# Patient Record
Sex: FEMALE | Race: WHITE | NOT HISPANIC OR LATINO | Employment: FULL TIME | URBAN - METROPOLITAN AREA
[De-identification: names, ages, dates, MRNs, and addresses within clinical notes are randomized per-mention and may not be internally consistent; named-entity substitution may affect disease eponyms.]

---

## 2023-02-23 PROBLEM — J01.90 ACUTE NON-RECURRENT SINUSITIS: Status: ACTIVE | Noted: 2023-02-23

## 2023-02-23 PROBLEM — F52.6 SEXUAL PAIN DISORDER: Status: ACTIVE | Noted: 2023-02-23

## 2023-02-23 PROBLEM — J02.9 ACUTE PHARYNGITIS: Status: ACTIVE | Noted: 2023-02-23

## 2023-02-23 PROBLEM — G43.711 CHRONIC MIGRAINE WITHOUT AURA, WITH INTRACTABLE MIGRAINE, SO STATED, WITH STATUS MIGRAINOSUS: Status: ACTIVE | Noted: 2023-02-23

## 2023-02-23 PROBLEM — G43.109 MIGRAINE WITH VISUAL AURA: Status: ACTIVE | Noted: 2023-02-23

## 2023-02-23 PROBLEM — G44.40 MEDICATION OVERUSE HEADACHE: Status: ACTIVE | Noted: 2023-02-23

## 2023-02-23 PROBLEM — J18.1 PNEUMONIA, LOBAR (CMS-HCC): Status: ACTIVE | Noted: 2023-02-23

## 2023-02-23 PROBLEM — F41.9 ANXIETY SYNDROME: Status: ACTIVE | Noted: 2023-02-23

## 2023-02-23 PROBLEM — N95.2 ATROPHIC VAGINITIS: Status: ACTIVE | Noted: 2023-02-23

## 2023-02-23 PROBLEM — K21.9 GERD (GASTROESOPHAGEAL REFLUX DISEASE): Status: ACTIVE | Noted: 2023-02-23

## 2023-02-23 PROBLEM — I34.1 MITRAL VALVE PROLAPSE, NONRHEUMATIC: Status: ACTIVE | Noted: 2023-02-23

## 2023-02-23 PROBLEM — T88.59XA ANESTHESIA COMPLICATION: Status: ACTIVE | Noted: 2023-02-23

## 2023-02-23 PROBLEM — E55.9 VITAMIN D DEFICIENCY: Status: ACTIVE | Noted: 2023-02-23

## 2023-02-23 PROBLEM — R05.2 SUBACUTE COUGH: Status: ACTIVE | Noted: 2023-02-23

## 2023-02-23 PROBLEM — R93.5 ABNORMAL ULTRASOUND OF ENDOMETRIUM: Status: ACTIVE | Noted: 2023-02-23

## 2023-02-23 PROBLEM — R10.2 FEMALE PELVIC PAIN: Status: ACTIVE | Noted: 2023-02-23

## 2023-02-23 PROBLEM — N80.03 ADENOMYOSIS: Status: ACTIVE | Noted: 2023-02-23

## 2023-02-23 PROBLEM — N93.9 ABNORMAL UTERINE BLEEDING (AUB): Status: ACTIVE | Noted: 2023-02-23

## 2023-02-23 PROBLEM — H66.91 RIGHT OTITIS MEDIA: Status: ACTIVE | Noted: 2023-02-23

## 2023-02-23 PROBLEM — D49.2 ABNORMAL SKIN GROWTH: Status: ACTIVE | Noted: 2023-02-23

## 2023-02-23 PROBLEM — R92.30 DENSE BREAST TISSUE ON MAMMOGRAM: Status: ACTIVE | Noted: 2023-02-23

## 2023-02-23 PROBLEM — R35.0 URINARY FREQUENCY: Status: ACTIVE | Noted: 2023-02-23

## 2023-02-23 RX ORDER — EPINEPHRINE 0.22MG
1 AEROSOL WITH ADAPTER (ML) INHALATION DAILY
COMMUNITY

## 2023-02-23 RX ORDER — MEDROXYPROGESTERONE ACETATE 10 MG/1
10 TABLET ORAL
COMMUNITY
Start: 2022-07-26 | End: 2023-10-25 | Stop reason: ALTCHOICE

## 2023-02-23 RX ORDER — DEXTROMETHORPHAN POLISTIREX 30 MG/5ML
5 SUSPENSION ORAL 2 TIMES DAILY
COMMUNITY
End: 2023-10-25 | Stop reason: ALTCHOICE

## 2023-02-23 RX ORDER — ACETAMINOPHEN 500 MG
1 TABLET ORAL DAILY
COMMUNITY

## 2023-02-23 RX ORDER — ALBUTEROL SULFATE 0.83 MG/ML
2.5 SOLUTION RESPIRATORY (INHALATION)
COMMUNITY
End: 2023-10-25 | Stop reason: ALTCHOICE

## 2023-02-23 RX ORDER — FREMANEZUMAB-VFRM 225 MG/1.5ML
3 INJECTION SUBCUTANEOUS
COMMUNITY
Start: 2021-12-07 | End: 2024-05-07 | Stop reason: SDUPTHER

## 2023-02-23 RX ORDER — GARLIC 1000 MG
1 CAPSULE ORAL DAILY
COMMUNITY
End: 2023-10-25 | Stop reason: SDUPTHER

## 2023-02-23 RX ORDER — SUMATRIPTAN SUCCINATE 100 MG/1
100 TABLET ORAL
COMMUNITY
Start: 2021-06-29 | End: 2023-04-17

## 2023-02-23 RX ORDER — KETOROLAC TROMETHAMINE 10 MG/1
10 TABLET, FILM COATED ORAL EVERY 6 HOURS
COMMUNITY
Start: 2022-09-29 | End: 2023-10-25 | Stop reason: ALTCHOICE

## 2023-02-23 RX ORDER — UBIDECARENONE 30 MG
1 CAPSULE ORAL DAILY
COMMUNITY

## 2023-02-23 RX ORDER — PANTOPRAZOLE SODIUM 40 MG/1
40 TABLET, DELAYED RELEASE ORAL DAILY
COMMUNITY
End: 2023-10-25 | Stop reason: ALTCHOICE

## 2023-02-23 RX ORDER — ORPHENADRINE CITRATE 100 MG/1
100 TABLET, EXTENDED RELEASE ORAL NIGHTLY PRN
COMMUNITY
End: 2023-10-25 | Stop reason: ALTCHOICE

## 2023-03-06 ENCOUNTER — APPOINTMENT (OUTPATIENT)
Dept: PRIMARY CARE | Facility: CLINIC | Age: 53
End: 2023-03-06

## 2023-03-16 ENCOUNTER — TELEPHONE (OUTPATIENT)
Dept: PRIMARY CARE | Facility: CLINIC | Age: 53
End: 2023-03-16

## 2023-03-16 NOTE — TELEPHONE ENCOUNTER
Patient called stating that she is feeling sick again. It started a week ago now. She's feeling achy and has been cough. She said it feels like her pneumonia might be creeping back in. She was wondering what she should do about this.

## 2023-03-28 ENCOUNTER — OFFICE VISIT (OUTPATIENT)
Dept: PRIMARY CARE | Facility: CLINIC | Age: 53
End: 2023-03-28
Payer: COMMERCIAL

## 2023-03-28 VITALS
BODY MASS INDEX: 20.03 KG/M2 | RESPIRATION RATE: 16 BRPM | SYSTOLIC BLOOD PRESSURE: 118 MMHG | TEMPERATURE: 97.6 F | HEIGHT: 67 IN | DIASTOLIC BLOOD PRESSURE: 70 MMHG | HEART RATE: 77 BPM | WEIGHT: 127.6 LBS | OXYGEN SATURATION: 98 %

## 2023-03-28 DIAGNOSIS — J40 BRONCHITIS: ICD-10-CM

## 2023-03-28 DIAGNOSIS — Z00.00 WELL ADULT EXAM: Primary | ICD-10-CM

## 2023-03-28 DIAGNOSIS — Z00.00 HEALTH CARE MAINTENANCE: ICD-10-CM

## 2023-03-28 DIAGNOSIS — L65.9 HAIR LOSS: ICD-10-CM

## 2023-03-28 DIAGNOSIS — E55.9 VITAMIN D DEFICIENCY: ICD-10-CM

## 2023-03-28 DIAGNOSIS — Z12.31 BREAST CANCER SCREENING BY MAMMOGRAM: ICD-10-CM

## 2023-03-28 DIAGNOSIS — R92.30 DENSE BREAST TISSUE ON MAMMOGRAM: ICD-10-CM

## 2023-03-28 PROCEDURE — 99396 PREV VISIT EST AGE 40-64: CPT | Performed by: INTERNAL MEDICINE

## 2023-03-28 PROCEDURE — 1036F TOBACCO NON-USER: CPT | Performed by: INTERNAL MEDICINE

## 2023-03-28 RX ORDER — BENZONATATE 100 MG/1
100 CAPSULE ORAL 3 TIMES DAILY PRN
Qty: 42 CAPSULE | Refills: 0 | Status: SHIPPED | OUTPATIENT
Start: 2023-03-28 | End: 2023-03-28 | Stop reason: SDUPTHER

## 2023-03-28 RX ORDER — BENZONATATE 100 MG/1
100 CAPSULE ORAL 3 TIMES DAILY PRN
Qty: 42 CAPSULE | Refills: 0 | Status: SHIPPED | OUTPATIENT
Start: 2023-03-28 | End: 2023-04-27

## 2023-03-28 ASSESSMENT — ENCOUNTER SYMPTOMS
CHEST TIGHTNESS: 0
CHILLS: 0
SHORTNESS OF BREATH: 0
SORE THROAT: 0
NAUSEA: 0
ABDOMINAL PAIN: 0
VOMITING: 0
ROS SKIN COMMENTS: HAIR LOSS.
FEVER: 0
CONSTIPATION: 0
FATIGUE: 1
DIFFICULTY URINATING: 0
SINUS PAIN: 0
COUGH: 0
WHEEZING: 0
COUGH: 1
WEAKNESS: 0
OCCASIONAL FEELINGS OF UNSTEADINESS: 0
DIZZINESS: 0
EYE DISCHARGE: 0
FATIGUE: 0
JOINT SWELLING: 0
HEADACHES: 0
LOSS OF SENSATION IN FEET: 0
APPETITE CHANGE: 0
DIARRHEA: 0
PALPITATIONS: 0
FREQUENCY: 0
SPEECH DIFFICULTY: 0
ARTHRALGIAS: 0
DIAPHORESIS: 0

## 2023-03-28 ASSESSMENT — PATIENT HEALTH QUESTIONNAIRE - PHQ9
1. LITTLE INTEREST OR PLEASURE IN DOING THINGS: NOT AT ALL
2. FEELING DOWN, DEPRESSED OR HOPELESS: NOT AT ALL
SUM OF ALL RESPONSES TO PHQ9 QUESTIONS 1 AND 2: 0

## 2023-03-28 ASSESSMENT — PAIN SCALES - GENERAL: PAINLEVEL: 0-NO PAIN

## 2023-03-28 NOTE — PROGRESS NOTES
Subjective   Patient ID: Jeana Graham is a 52 y.o. female who presents for Annual Exam (CPE).    Well Adult Physical   Patient here for a comprehensive physical exam.The patient reports  problems- major hair loss x2 weeks     Do you take any herbs or supplements that were not prescribed by a doctor? yes   Are you taking calcium supplements? yes   Are you taking aspirin daily? no     History:  LMP: No LMP recorded.  Menopause at none  years  Last pap date:   Abnormal pap? yes  : 3           Patient thinks her pneumonia is coming back last 2 weeks- has a cough, fatigue        Review of Systems   Constitutional:  Positive for fatigue.   Respiratory:  Positive for cough.    All other systems reviewed and are negative.      Objective   There were no vitals taken for this visit.    Physical Exam    Assessment/Plan

## 2023-03-28 NOTE — PROGRESS NOTES
"Subjective   Jeana Graham is a 52 y.o. female who presents for Annual Exam (CPE).      Thought her pneumonia was coming back but then saw that there was an antibiotic at home and took it but doesn't know what it was.    No history of asthma.  Losing hair and thinks it may be hormonal.  No bald spots per her .    Has had irregular menses since COVID.         Review of Systems   Constitutional:  Negative for appetite change, chills, diaphoresis, fatigue and fever.   HENT:  Negative for congestion, ear discharge, ear pain, sinus pain and sore throat.    Eyes:  Negative for discharge.   Respiratory:  Negative for cough, chest tightness, shortness of breath and wheezing.    Cardiovascular:  Negative for chest pain, palpitations and leg swelling.   Gastrointestinal:  Negative for abdominal pain, constipation, diarrhea, nausea and vomiting.   Endocrine: Negative for cold intolerance, heat intolerance and polyuria.   Genitourinary:  Negative for difficulty urinating and frequency.   Musculoskeletal:  Negative for arthralgias and joint swelling.   Skin:  Negative for rash.        Hair loss.     Neurological:  Negative for dizziness, speech difficulty, weakness and headaches.       Objective   /70 (BP Location: Left arm, Patient Position: Sitting)   Pulse 77   Temp 36.4 °C (97.6 °F)   Resp 16   Ht 1.708 m (5' 7.25\")   Wt 57.9 kg (127 lb 9.6 oz)   LMP 01/04/2023 (Exact Date) Comment: had spotting last week  SpO2 98%   BMI 19.84 kg/m²       Physical Exam  Constitutional:       Appearance: Normal appearance.   HENT:      Head: Normocephalic.      Right Ear: Tympanic membrane, ear canal and external ear normal.      Left Ear: Tympanic membrane, ear canal and external ear normal.      Nose: Nose normal.      Mouth/Throat:      Mouth: Mucous membranes are moist.      Pharynx: Oropharynx is clear.   Eyes:      Conjunctiva/sclera: Conjunctivae normal.   Cardiovascular:      Rate and Rhythm: Normal rate and " regular rhythm.   Pulmonary:      Effort: Pulmonary effort is normal.      Breath sounds: Normal breath sounds.   Abdominal:      General: Abdomen is flat. Bowel sounds are normal.      Palpations: Abdomen is soft.   Musculoskeletal:         General: Normal range of motion.      Cervical back: Neck supple.   Skin:     General: Skin is warm and dry.   Neurological:      General: No focal deficit present.      Mental Status: She is alert and oriented to person, place, and time.   Psychiatric:         Mood and Affect: Mood normal.         Assessment/Plan   Problem List Items Addressed This Visit          Respiratory    Bronchitis     Jeana presents here today for her annual wellness visit but is also concerned about her ongoing cough.  She had a COVID infection and subsequent lobar pneumonia in the last 3 months, and he is concerned that this is returned.  Her exam today is unremarkable and her lungs are completely clear.  There are no wheezes rales or rhonchi throughout.  Her pulse ox is 98% and she is afebrile.  We will continue to treat with as needed albuterol clear liquids and add Tessalon Perles for cough.            Endocrine/Metabolic    Vitamin D deficiency       Other    Dense breast tissue on mammogram    Relevant Orders    BI mammo bilateral screening tomosynthesis    Well adult exam - Primary    Relevant Orders    Comprehensive Metabolic Panel    Lipid Panel    TSH with reflex to Free T4 if abnormal    Hair loss     Jeana is also concerned about some hair loss noted that since her COVID-19 infection she is unable to run her fingers through her hair and drink here out.  Her hairdresser does not notice any clumps or loss of hair that is obvious.  Today's exam does not demonstrate any areas of alopecia and no hair follicles appear intact.  I did suggest that this could be a natural hair loss due to the stress of her recent COVID-19 infection as well as her left lower lobe pneumonia.  We will monitor this  carefully.  She is also due to see her OB/GYN GYN in the coming months and will be evaluated at that point for postmenopausal symptoms.         Relevant Orders    Comprehensive Metabolic Panel    TSH with reflex to Free T4 if abnormal     Other Visit Diagnoses       Breast cancer screening by mammogram        Relevant Orders    BI mammo bilateral screening tomosynthesis    CBC    TSH with reflex to Free T4 if abnormal          Encounter Diagnoses   Name Primary?    Well adult exam Yes    Breast cancer screening by mammogram     Vitamin D deficiency     Dense breast tissue on mammogram     Hair loss     Bronchitis      Neri Chatman, DO

## 2023-03-30 PROBLEM — J40 BRONCHITIS: Status: ACTIVE | Noted: 2023-03-30

## 2023-03-30 PROBLEM — J18.1 PNEUMONIA, LOBAR (CMS-HCC): Status: RESOLVED | Noted: 2023-02-23 | Resolved: 2023-03-30

## 2023-03-30 PROBLEM — J02.9 ACUTE PHARYNGITIS: Status: RESOLVED | Noted: 2023-02-23 | Resolved: 2023-03-30

## 2023-03-30 PROBLEM — R05.2 SUBACUTE COUGH: Status: RESOLVED | Noted: 2023-02-23 | Resolved: 2023-03-30

## 2023-03-30 PROBLEM — L65.9 HAIR LOSS: Status: ACTIVE | Noted: 2023-03-30

## 2023-03-30 PROBLEM — H66.91 RIGHT OTITIS MEDIA: Status: RESOLVED | Noted: 2023-02-23 | Resolved: 2023-03-30

## 2023-03-30 NOTE — ASSESSMENT & PLAN NOTE
Jeana is also concerned about some hair loss noted that since her COVID-19 infection she is unable to run her fingers through her hair and drink here out.  Her hairdresser does not notice any clumps or loss of hair that is obvious.  Today's exam does not demonstrate any areas of alopecia and no hair follicles appear intact.  I did suggest that this could be a natural hair loss due to the stress of her recent COVID-19 infection as well as her left lower lobe pneumonia.  We will monitor this carefully.  She is also due to see her OB/GYN GYN in the coming months and will be evaluated at that point for postmenopausal symptoms.

## 2023-03-30 NOTE — ASSESSMENT & PLAN NOTE
Jeana presents here today for her annual wellness visit but is also concerned about her ongoing cough.  She had a COVID infection and subsequent lobar pneumonia in the last 3 months, and he is concerned that this is returned.  Her exam today is unremarkable and her lungs are completely clear.  There are no wheezes rales or rhonchi throughout.  Her pulse ox is 98% and she is afebrile.  We will continue to treat with as needed albuterol clear liquids and add Tessalon Perles for cough.

## 2023-04-06 ENCOUNTER — OFFICE VISIT (OUTPATIENT)
Dept: PRIMARY CARE | Facility: CLINIC | Age: 53
End: 2023-04-06
Payer: COMMERCIAL

## 2023-04-06 VITALS
BODY MASS INDEX: 20.07 KG/M2 | HEART RATE: 97 BPM | RESPIRATION RATE: 16 BRPM | TEMPERATURE: 98 F | DIASTOLIC BLOOD PRESSURE: 76 MMHG | WEIGHT: 127.9 LBS | SYSTOLIC BLOOD PRESSURE: 134 MMHG | OXYGEN SATURATION: 99 % | HEIGHT: 67 IN

## 2023-04-06 DIAGNOSIS — R53.83 OTHER FATIGUE: ICD-10-CM

## 2023-04-06 DIAGNOSIS — R05.3 CHRONIC COUGH: ICD-10-CM

## 2023-04-06 DIAGNOSIS — J18.9 PNEUMONIA OF RIGHT UPPER LOBE DUE TO INFECTIOUS ORGANISM: Primary | ICD-10-CM

## 2023-04-06 PROCEDURE — 1036F TOBACCO NON-USER: CPT | Performed by: INTERNAL MEDICINE

## 2023-04-06 PROCEDURE — 93000 ELECTROCARDIOGRAM COMPLETE: CPT | Performed by: INTERNAL MEDICINE

## 2023-04-06 PROCEDURE — 99214 OFFICE O/P EST MOD 30 MIN: CPT | Performed by: INTERNAL MEDICINE

## 2023-04-06 ASSESSMENT — ENCOUNTER SYMPTOMS: COUGH: 1

## 2023-04-06 NOTE — PROGRESS NOTES
"Subjective   Jeana Graham is a 52 y.o. female who presents for Follow-up (Chest x-ray) and Cough.      Cough  This is a recurrent problem. The current episode started more than 1 month ago. The problem has been gradually improving. The problem occurs every few minutes. The cough is Non-productive.       Review of Systems   Respiratory:  Positive for cough.        Objective   /76   Pulse 97   Temp 36.7 °C (98 °F)   Resp 16   Ht 1.708 m (5' 7.25\")   Wt 58 kg (127 lb 14.4 oz)   LMP 01/04/2023 (Exact Date) Comment: had spotting last week  SpO2 99%   BMI 19.88 kg/m²       Physical Exam  Constitutional:       General: She is not in acute distress.     Appearance: She is ill-appearing. She is not toxic-appearing or diaphoretic.   HENT:      Right Ear: Tympanic membrane normal.      Left Ear: Tympanic membrane normal.      Nose: Nose normal.      Mouth/Throat:      Mouth: Mucous membranes are moist.   Eyes:      Extraocular Movements: Extraocular movements intact.      Conjunctiva/sclera: Conjunctivae normal.      Pupils: Pupils are equal, round, and reactive to light.   Cardiovascular:      Rate and Rhythm: Normal rate and regular rhythm.      Heart sounds: No murmur heard.     No friction rub.   Pulmonary:      Effort: Pulmonary effort is normal. No accessory muscle usage or respiratory distress.      Breath sounds: Normal breath sounds. No stridor. No wheezing, rhonchi or rales.   Chest:      Chest wall: No tenderness.   Musculoskeletal:      Cervical back: Normal range of motion and neck supple. No rigidity or tenderness.   Skin:     General: Skin is warm and dry.      Coloration: Skin is pale.      Findings: No erythema or rash.   Neurological:      General: No focal deficit present.      Mental Status: She is alert and oriented to person, place, and time.         Assessment/Plan   Problem List Items Addressed This Visit          Respiratory    Pneumonia of right upper lobe due to infectious organism - " Primary       Other    Other fatigue    Relevant Orders    ECG 12 lead (Clinic Performed)    XR chest 2 views (Completed)    Comprehensive Metabolic Panel (Completed)    CBC (Completed)    Sedimentation Rate (Completed)    C-reactive protein (Completed)     Other Visit Diagnoses       Chronic cough        Relevant Orders    ECG 12 lead (Clinic Performed)    XR chest 2 views (Completed)    Comprehensive Metabolic Panel (Completed)    CBC (Completed)    Sedimentation Rate (Completed)    C-reactive protein (Completed)          Encounter Diagnoses   Name Primary?    Pneumonia of right upper lobe due to infectious organism Yes    Chronic cough     Other fatigue      Neri Chatman, DO

## 2023-04-07 ENCOUNTER — LAB (OUTPATIENT)
Dept: LAB | Facility: LAB | Age: 53
End: 2023-04-07
Payer: COMMERCIAL

## 2023-04-07 DIAGNOSIS — Z00.00 WELL ADULT EXAM: ICD-10-CM

## 2023-04-07 DIAGNOSIS — J18.9 PNEUMONIA OF RIGHT UPPER LOBE DUE TO INFECTIOUS ORGANISM: Primary | ICD-10-CM

## 2023-04-07 DIAGNOSIS — R05.3 CHRONIC COUGH: ICD-10-CM

## 2023-04-07 DIAGNOSIS — L65.9 HAIR LOSS: ICD-10-CM

## 2023-04-07 DIAGNOSIS — Z12.31 BREAST CANCER SCREENING BY MAMMOGRAM: ICD-10-CM

## 2023-04-07 DIAGNOSIS — R53.83 OTHER FATIGUE: ICD-10-CM

## 2023-04-07 LAB
ALANINE AMINOTRANSFERASE (SGPT) (U/L) IN SER/PLAS: 15 U/L (ref 7–45)
ALBUMIN (G/DL) IN SER/PLAS: 3.6 G/DL (ref 3.4–5)
ALKALINE PHOSPHATASE (U/L) IN SER/PLAS: 96 U/L (ref 33–110)
ANION GAP IN SER/PLAS: 14 MMOL/L (ref 10–20)
ASPARTATE AMINOTRANSFERASE (SGOT) (U/L) IN SER/PLAS: 13 U/L (ref 9–39)
BILIRUBIN TOTAL (MG/DL) IN SER/PLAS: 0.5 MG/DL (ref 0–1.2)
C REACTIVE PROTEIN (MG/L) IN SER/PLAS: 10.79 MG/DL
CALCIUM (MG/DL) IN SER/PLAS: 9.5 MG/DL (ref 8.6–10.3)
CARBON DIOXIDE, TOTAL (MMOL/L) IN SER/PLAS: 28 MMOL/L (ref 21–32)
CHLORIDE (MMOL/L) IN SER/PLAS: 102 MMOL/L (ref 98–107)
CHOLESTEROL (MG/DL) IN SER/PLAS: 143 MG/DL (ref 0–199)
CHOLESTEROL IN HDL (MG/DL) IN SER/PLAS: 42.3 MG/DL
CHOLESTEROL/HDL RATIO: 3.4
CREATININE (MG/DL) IN SER/PLAS: 0.82 MG/DL (ref 0.5–1.05)
ERYTHROCYTE DISTRIBUTION WIDTH (RATIO) BY AUTOMATED COUNT: 13.1 % (ref 11.5–14.5)
ERYTHROCYTE MEAN CORPUSCULAR HEMOGLOBIN CONCENTRATION (G/DL) BY AUTOMATED: 30.9 G/DL (ref 32–36)
ERYTHROCYTE MEAN CORPUSCULAR VOLUME (FL) BY AUTOMATED COUNT: 92 FL (ref 80–100)
ERYTHROCYTES (10*6/UL) IN BLOOD BY AUTOMATED COUNT: 3.96 X10E12/L (ref 4–5.2)
GFR FEMALE: 85 ML/MIN/1.73M2
GLUCOSE (MG/DL) IN SER/PLAS: 96 MG/DL (ref 74–99)
HEMATOCRIT (%) IN BLOOD BY AUTOMATED COUNT: 36.6 % (ref 36–46)
HEMOGLOBIN (G/DL) IN BLOOD: 11.3 G/DL (ref 12–16)
LDL: 80 MG/DL (ref 0–99)
LEUKOCYTES (10*3/UL) IN BLOOD BY AUTOMATED COUNT: 12.1 X10E9/L (ref 4.4–11.3)
PLATELETS (10*3/UL) IN BLOOD AUTOMATED COUNT: 459 X10E9/L (ref 150–450)
POTASSIUM (MMOL/L) IN SER/PLAS: 4.2 MMOL/L (ref 3.5–5.3)
PROTEIN TOTAL: 7.1 G/DL (ref 6.4–8.2)
SEDIMENTATION RATE, ERYTHROCYTE: 69 MM/H (ref 0–30)
SODIUM (MMOL/L) IN SER/PLAS: 140 MMOL/L (ref 136–145)
THYROTROPIN (MIU/L) IN SER/PLAS BY DETECTION LIMIT <= 0.05 MIU/L: 1.11 MIU/L (ref 0.44–3.98)
TRIGLYCERIDE (MG/DL) IN SER/PLAS: 102 MG/DL (ref 0–149)
UREA NITROGEN (MG/DL) IN SER/PLAS: 19 MG/DL (ref 6–23)
VLDL: 20 MG/DL (ref 0–40)

## 2023-04-07 PROCEDURE — 86140 C-REACTIVE PROTEIN: CPT

## 2023-04-07 PROCEDURE — 85027 COMPLETE CBC AUTOMATED: CPT

## 2023-04-07 PROCEDURE — 80061 LIPID PANEL: CPT

## 2023-04-07 PROCEDURE — 80053 COMPREHEN METABOLIC PANEL: CPT

## 2023-04-07 PROCEDURE — 84443 ASSAY THYROID STIM HORMONE: CPT

## 2023-04-07 PROCEDURE — 85652 RBC SED RATE AUTOMATED: CPT

## 2023-04-07 PROCEDURE — 36415 COLL VENOUS BLD VENIPUNCTURE: CPT

## 2023-04-07 RX ORDER — AZITHROMYCIN 250 MG/1
TABLET, FILM COATED ORAL
Qty: 6 TABLET | Refills: 0 | Status: SHIPPED | OUTPATIENT
Start: 2023-04-07 | End: 2023-04-12

## 2023-04-07 RX ORDER — CEFUROXIME AXETIL 500 MG/1
500 TABLET ORAL 2 TIMES DAILY
Qty: 20 TABLET | Refills: 0 | Status: SHIPPED | OUTPATIENT
Start: 2023-04-07 | End: 2023-04-17

## 2023-04-07 NOTE — PROGRESS NOTES
Discussed abnormal CXR results and Increased WBC and Sed Rate.    Will repeat treatment for RUL pneumonia, and will follow up in 1 to 2 weeks.  Consider CT at follow up.    
DISPLAY PLAN FREE TEXT

## 2023-04-10 PROBLEM — R53.83 OTHER FATIGUE: Status: ACTIVE | Noted: 2023-04-10

## 2023-04-10 PROBLEM — J18.9 PNEUMONIA OF RIGHT UPPER LOBE DUE TO INFECTIOUS ORGANISM: Status: ACTIVE | Noted: 2023-04-10

## 2023-04-10 NOTE — RESULT ENCOUNTER NOTE
Please call  Jeana Graham to see how she is doing and ask her to schedule follow up in 2 weeks.
unknown/> 20 years ago

## 2023-04-10 NOTE — ASSESSMENT & PLAN NOTE
Jeana has symptoms of persistent cough and appears ill long after COVID has resolved.  RUL infiltrate persists.  Will add Ceftin and Azithromycin for atypical's and ask her to obtain labs.

## 2023-04-20 ENCOUNTER — LAB (OUTPATIENT)
Dept: LAB | Facility: LAB | Age: 53
End: 2023-04-20
Payer: COMMERCIAL

## 2023-04-20 ENCOUNTER — OFFICE VISIT (OUTPATIENT)
Dept: PRIMARY CARE | Facility: CLINIC | Age: 53
End: 2023-04-20
Payer: COMMERCIAL

## 2023-04-20 VITALS
TEMPERATURE: 98 F | HEART RATE: 81 BPM | WEIGHT: 130 LBS | SYSTOLIC BLOOD PRESSURE: 124 MMHG | BODY MASS INDEX: 20.4 KG/M2 | OXYGEN SATURATION: 99 % | DIASTOLIC BLOOD PRESSURE: 72 MMHG | RESPIRATION RATE: 16 BRPM | HEIGHT: 67 IN

## 2023-04-20 DIAGNOSIS — J18.9 PNEUMONIA OF RIGHT UPPER LOBE DUE TO INFECTIOUS ORGANISM: ICD-10-CM

## 2023-04-20 DIAGNOSIS — J18.9 PNEUMONIA OF RIGHT UPPER LOBE DUE TO INFECTIOUS ORGANISM: Primary | ICD-10-CM

## 2023-04-20 LAB
ALANINE AMINOTRANSFERASE (SGPT) (U/L) IN SER/PLAS: 14 U/L (ref 7–45)
ALBUMIN (G/DL) IN SER/PLAS: 3.7 G/DL (ref 3.4–5)
ALKALINE PHOSPHATASE (U/L) IN SER/PLAS: 70 U/L (ref 33–110)
ANION GAP IN SER/PLAS: 9 MMOL/L (ref 10–20)
ASPARTATE AMINOTRANSFERASE (SGOT) (U/L) IN SER/PLAS: 15 U/L (ref 9–39)
BASOPHILS (10*3/UL) IN BLOOD BY AUTOMATED COUNT: 0.08 X10E9/L (ref 0–0.1)
BASOPHILS/100 LEUKOCYTES IN BLOOD BY AUTOMATED COUNT: 1.2 % (ref 0–2)
BILIRUBIN TOTAL (MG/DL) IN SER/PLAS: 0.4 MG/DL (ref 0–1.2)
CALCIUM (MG/DL) IN SER/PLAS: 9.1 MG/DL (ref 8.6–10.3)
CARBON DIOXIDE, TOTAL (MMOL/L) IN SER/PLAS: 31 MMOL/L (ref 21–32)
CHLORIDE (MMOL/L) IN SER/PLAS: 103 MMOL/L (ref 98–107)
CREATININE (MG/DL) IN SER/PLAS: 0.78 MG/DL (ref 0.5–1.05)
EOSINOPHILS (10*3/UL) IN BLOOD BY AUTOMATED COUNT: 0.12 X10E9/L (ref 0–0.7)
EOSINOPHILS/100 LEUKOCYTES IN BLOOD BY AUTOMATED COUNT: 1.8 % (ref 0–6)
ERYTHROCYTE DISTRIBUTION WIDTH (RATIO) BY AUTOMATED COUNT: 13.6 % (ref 11.5–14.5)
ERYTHROCYTE MEAN CORPUSCULAR HEMOGLOBIN CONCENTRATION (G/DL) BY AUTOMATED: 30.8 G/DL (ref 32–36)
ERYTHROCYTE MEAN CORPUSCULAR VOLUME (FL) BY AUTOMATED COUNT: 92 FL (ref 80–100)
ERYTHROCYTES (10*6/UL) IN BLOOD BY AUTOMATED COUNT: 3.94 X10E12/L (ref 4–5.2)
GFR FEMALE: 90 ML/MIN/1.73M2
GLUCOSE (MG/DL) IN SER/PLAS: 88 MG/DL (ref 74–99)
HEMATOCRIT (%) IN BLOOD BY AUTOMATED COUNT: 36.4 % (ref 36–46)
HEMOGLOBIN (G/DL) IN BLOOD: 11.2 G/DL (ref 12–16)
IMMATURE GRANULOCYTES/100 LEUKOCYTES IN BLOOD BY AUTOMATED COUNT: 0.3 % (ref 0–0.9)
LEUKOCYTES (10*3/UL) IN BLOOD BY AUTOMATED COUNT: 6.8 X10E9/L (ref 4.4–11.3)
LYMPHOCYTES (10*3/UL) IN BLOOD BY AUTOMATED COUNT: 1.88 X10E9/L (ref 1.2–4.8)
LYMPHOCYTES/100 LEUKOCYTES IN BLOOD BY AUTOMATED COUNT: 27.7 % (ref 13–44)
MONOCYTES (10*3/UL) IN BLOOD BY AUTOMATED COUNT: 0.44 X10E9/L (ref 0.1–1)
MONOCYTES/100 LEUKOCYTES IN BLOOD BY AUTOMATED COUNT: 6.5 % (ref 2–10)
NEUTROPHILS (10*3/UL) IN BLOOD BY AUTOMATED COUNT: 4.25 X10E9/L (ref 1.2–7.7)
NEUTROPHILS/100 LEUKOCYTES IN BLOOD BY AUTOMATED COUNT: 62.5 % (ref 40–80)
PLATELETS (10*3/UL) IN BLOOD AUTOMATED COUNT: 448 X10E9/L (ref 150–450)
POTASSIUM (MMOL/L) IN SER/PLAS: 3.9 MMOL/L (ref 3.5–5.3)
PROTEIN TOTAL: 6.6 G/DL (ref 6.4–8.2)
SODIUM (MMOL/L) IN SER/PLAS: 139 MMOL/L (ref 136–145)
UREA NITROGEN (MG/DL) IN SER/PLAS: 15 MG/DL (ref 6–23)

## 2023-04-20 PROCEDURE — 85025 COMPLETE CBC W/AUTO DIFF WBC: CPT

## 2023-04-20 PROCEDURE — 99212 OFFICE O/P EST SF 10 MIN: CPT | Performed by: INTERNAL MEDICINE

## 2023-04-20 PROCEDURE — 80053 COMPREHEN METABOLIC PANEL: CPT

## 2023-04-20 PROCEDURE — 36415 COLL VENOUS BLD VENIPUNCTURE: CPT

## 2023-04-20 PROCEDURE — 1036F TOBACCO NON-USER: CPT | Performed by: INTERNAL MEDICINE

## 2023-04-20 ASSESSMENT — PAIN SCALES - GENERAL: PAINLEVEL: 0-NO PAIN

## 2023-04-20 ASSESSMENT — ENCOUNTER SYMPTOMS: COUGH: 1

## 2023-04-20 NOTE — PROGRESS NOTES
"Subjective   Jeana Graham is a 52 y.o. female who presents for Follow-up (Abnormal chest xray).      Patient states that her cough has gotten better since taking Ceftin and Flomax- still has a scratchy voice but feels much better in general     Cough  This is a recurrent problem. The current episode started more than 1 month ago. The problem has been gradually improving. The problem occurs every few hours. The cough is Non-productive. She has tried prescription cough suppressant for the symptoms. The treatment provided moderate relief. Her past medical history is significant for pneumonia.       Review of Systems   Respiratory:  Positive for cough.    All other systems reviewed and are negative.      Objective   /72 (BP Location: Right arm, Patient Position: Sitting)   Pulse 81   Temp 36.7 °C (98 °F)   Resp 16   Ht 1.708 m (5' 7.25\")   Wt 59 kg (130 lb)   LMP 01/04/2023 (Exact Date)   SpO2 99%   BMI 20.21 kg/m²       Physical Exam  Constitutional:       Appearance: Normal appearance.   HENT:      Head: Normocephalic.   Cardiovascular:      Rate and Rhythm: Normal rate and regular rhythm.   Pulmonary:      Effort: Pulmonary effort is normal.   Musculoskeletal:      Cervical back: Neck supple.      Right lower leg: No edema.      Left lower leg: No edema.   Skin:     General: Skin is warm and dry.   Psychiatric:         Mood and Affect: Mood normal.         Assessment/Plan   Problem List Items Addressed This Visit          Respiratory    Pneumonia of right upper lobe due to infectious organism - Primary    Relevant Orders    CBC and Auto Differential    Comprehensive metabolic panel     Encounter Diagnosis   Name Primary?    Pneumonia of right upper lobe due to infectious organism Yes     Neri Chatman, DO     "

## 2023-04-22 NOTE — RESULT ENCOUNTER NOTE
Please let Jeana Graham know that labs are unremarkable and any outside of normal range will be reviewed at your follow up as scheduled

## 2023-06-19 ENCOUNTER — TELEPHONE (OUTPATIENT)
Dept: PRIMARY CARE | Facility: CLINIC | Age: 53
End: 2023-06-19

## 2023-06-19 DIAGNOSIS — Z00.00 ENCOUNTER FOR GENERAL ADULT MEDICAL EXAMINATION WITHOUT ABNORMAL FINDINGS: ICD-10-CM

## 2023-06-19 RX ORDER — SUMATRIPTAN SUCCINATE 100 MG/1
100 TABLET ORAL AS NEEDED
Qty: 27 TABLET | Refills: 3 | Status: SHIPPED | OUTPATIENT
Start: 2023-06-19 | End: 2024-01-09

## 2023-09-21 LAB
BASOPHILS (10*3/UL) IN BLOOD BY AUTOMATED COUNT: 0.06 X10E9/L (ref 0–0.1)
BASOPHILS/100 LEUKOCYTES IN BLOOD BY AUTOMATED COUNT: 0.8 % (ref 0–2)
EOSINOPHILS (10*3/UL) IN BLOOD BY AUTOMATED COUNT: 0.08 X10E9/L (ref 0–0.7)
EOSINOPHILS/100 LEUKOCYTES IN BLOOD BY AUTOMATED COUNT: 1.1 % (ref 0–6)
ERYTHROCYTE DISTRIBUTION WIDTH (RATIO) BY AUTOMATED COUNT: 12.7 % (ref 11.5–14.5)
ERYTHROCYTE MEAN CORPUSCULAR HEMOGLOBIN CONCENTRATION (G/DL) BY AUTOMATED: 33 G/DL (ref 32–36)
ERYTHROCYTE MEAN CORPUSCULAR VOLUME (FL) BY AUTOMATED COUNT: 98 FL (ref 80–100)
ERYTHROCYTES (10*6/UL) IN BLOOD BY AUTOMATED COUNT: 4.22 X10E12/L (ref 4–5.2)
FERRITIN (UG/LL) IN SER/PLAS: 26 UG/L (ref 8–150)
HEMATOCRIT (%) IN BLOOD BY AUTOMATED COUNT: 41.2 % (ref 36–46)
HEMOGLOBIN (G/DL) IN BLOOD: 13.6 G/DL (ref 12–16)
IMMATURE GRANULOCYTES/100 LEUKOCYTES IN BLOOD BY AUTOMATED COUNT: 0.1 % (ref 0–0.9)
IRON (UG/DL) IN SER/PLAS: 63 UG/DL (ref 35–150)
IRON BINDING CAPACITY (UG/DL) IN SER/PLAS: 371 UG/DL (ref 240–445)
IRON SATURATION (%) IN SER/PLAS: 17 % (ref 25–45)
LEUKOCYTES (10*3/UL) IN BLOOD BY AUTOMATED COUNT: 7.4 X10E9/L (ref 4.4–11.3)
LYMPHOCYTES (10*3/UL) IN BLOOD BY AUTOMATED COUNT: 1.92 X10E9/L (ref 1.2–4.8)
LYMPHOCYTES/100 LEUKOCYTES IN BLOOD BY AUTOMATED COUNT: 25.9 % (ref 13–44)
MONOCYTES (10*3/UL) IN BLOOD BY AUTOMATED COUNT: 0.61 X10E9/L (ref 0.1–1)
MONOCYTES/100 LEUKOCYTES IN BLOOD BY AUTOMATED COUNT: 8.2 % (ref 2–10)
NEUTROPHILS (10*3/UL) IN BLOOD BY AUTOMATED COUNT: 4.73 X10E9/L (ref 1.2–7.7)
NEUTROPHILS/100 LEUKOCYTES IN BLOOD BY AUTOMATED COUNT: 63.9 % (ref 40–80)
PLATELETS (10*3/UL) IN BLOOD AUTOMATED COUNT: 295 X10E9/L (ref 150–450)

## 2023-10-11 ENCOUNTER — SPECIALTY PHARMACY (OUTPATIENT)
Dept: PHARMACY | Facility: CLINIC | Age: 53
End: 2023-10-11

## 2023-10-13 ENCOUNTER — SPECIALTY PHARMACY (OUTPATIENT)
Dept: PHARMACY | Facility: CLINIC | Age: 53
End: 2023-10-13

## 2023-10-25 ENCOUNTER — OFFICE VISIT (OUTPATIENT)
Dept: NEUROLOGY | Facility: CLINIC | Age: 53
End: 2023-10-25
Payer: COMMERCIAL

## 2023-10-25 VITALS — SYSTOLIC BLOOD PRESSURE: 112 MMHG | RESPIRATION RATE: 16 BRPM | DIASTOLIC BLOOD PRESSURE: 70 MMHG | HEART RATE: 76 BPM

## 2023-10-25 DIAGNOSIS — G43.711 CHRONIC MIGRAINE WITHOUT AURA, WITH INTRACTABLE MIGRAINE, SO STATED, WITH STATUS MIGRAINOSUS: Primary | ICD-10-CM

## 2023-10-25 DIAGNOSIS — G43.711 CHRONIC MIGRAINE WITHOUT AURA, INTRACTABLE, WITH STATUS MIGRAINOSUS: ICD-10-CM

## 2023-10-25 DIAGNOSIS — J31.0 CHRONIC RHINITIS: ICD-10-CM

## 2023-10-25 PROCEDURE — 99214 OFFICE O/P EST MOD 30 MIN: CPT | Performed by: PSYCHIATRY & NEUROLOGY

## 2023-10-25 PROCEDURE — 1036F TOBACCO NON-USER: CPT | Performed by: PSYCHIATRY & NEUROLOGY

## 2023-10-25 RX ORDER — IRON POLYSACCHARIDE COMPLEX 150 MG
150 CAPSULE ORAL DAILY
COMMUNITY
Start: 2023-09-21 | End: 2024-05-07 | Stop reason: ALTCHOICE

## 2023-10-25 RX ORDER — RIZATRIPTAN BENZOATE 10 MG/1
TABLET, ORALLY DISINTEGRATING ORAL
COMMUNITY
End: 2024-05-07 | Stop reason: ALTCHOICE

## 2023-10-25 RX ORDER — GARLIC 1000 MG
1 CAPSULE ORAL DAILY
COMMUNITY

## 2023-10-25 RX ORDER — CANDESARTAN 16 MG/1
16 TABLET ORAL DAILY
COMMUNITY

## 2023-10-25 RX ORDER — LECITHIN 1200 MG
1 CAPSULE ORAL DAILY
COMMUNITY

## 2023-10-25 RX ORDER — MONTELUKAST SODIUM 10 MG/1
10 TABLET ORAL NIGHTLY
Qty: 30 TABLET | Refills: 11 | Status: SHIPPED | OUTPATIENT
Start: 2023-10-25 | End: 2024-05-07 | Stop reason: ALTCHOICE

## 2023-10-25 NOTE — PROGRESS NOTES
"Increased candesartan to 16mg last visit August  Labs done for heart palpitations. Low saturation then started iron 4-6 weeks ago.      Lab Results   Component Value Date    WBC 7.4 09/21/2023    HGB 13.6 09/21/2023    HCT 41.2 09/21/2023    MCV 98 09/21/2023     09/21/2023      Lab Results   Component Value Date    IRON 63 09/21/2023    TIBC 371 09/21/2023    FERRITIN 26 09/21/2023       Latest Reference Range & Units Most Recent   % Saturation 25 - 45 % 17 (L)  9/21/23 09:44   (L): Data is abnormally low    Last MRI was a long time ago but would like to wait until she has met her deductible to repeat.     Candesartan has not changed headache experience.   Started iron supplement. Does not feel any different. Has a little constipation from the iron.     Continues \"I feel like I am living in constant migraine\"  Not disabling with treatment. Still does all activities she needs to do.   Not waking with migraine as frequently. Occasional wakes with one and did this morning.   More often, it starts about 10AM  Treatment pattern daily. 10AM feels aura of headache around eyes, ,blurry vision  and takes 2  Excedrin migraine. Helps until 3pm then takes 1/3 sumatriptan tab. Usually can get through rest of the day without distraction of headache.    4-5 days per week , migraine will return and become distracting  before bed and will treat with 1 Excedrin migraine  Once migraine starts at 10am has headache rest of day but can control pain level to continue activities with sumatriptan and Excedrin.   Feels in back of head \"like brain hurts\" at all times.   Has  not been using Rizatriptan as is not as helpful as Sumatriptan.   Has not yet done a 48 hour flat test.     Failed Emgality, botox, Zonegran, topamax (rash), acetazolamide.     Continues Ajovy. Is due for dose. Is managing left eye black out experience completely. If is late on dose, this symptoms will return. Will still have some mild black out when next Ajovy " "dose is due    Migraine temples and behind eye.  Associated light and noise and smell sensitivity. Left eye blacks out.   Triggers are certain smells.     Sleep has been \"ok \" averages 7 hours  Denies anxiety or depression.     States has tried Toradol protocol and medrol dose paks that don't impact her headache experience.   "

## 2023-10-25 NOTE — PATIENT INSTRUCTIONS
Lets try the Singulair at night. For 1 month if no help we can try the adderall.   48 hour flat test given.   Call in 1 month if singulair isnt helping

## 2023-10-30 ENCOUNTER — PHARMACY VISIT (OUTPATIENT)
Dept: PHARMACY | Facility: CLINIC | Age: 53
End: 2023-10-30
Payer: COMMERCIAL

## 2023-11-14 ENCOUNTER — SPECIALTY PHARMACY (OUTPATIENT)
Dept: PHARMACY | Facility: CLINIC | Age: 53
End: 2023-11-14

## 2023-11-14 ENCOUNTER — PHARMACY VISIT (OUTPATIENT)
Dept: PHARMACY | Facility: CLINIC | Age: 53
End: 2023-11-14
Payer: COMMERCIAL

## 2023-11-25 ENCOUNTER — SPECIALTY PHARMACY (OUTPATIENT)
Dept: PHARMACY | Facility: CLINIC | Age: 53
End: 2023-11-25

## 2023-11-30 ENCOUNTER — SPECIALTY PHARMACY (OUTPATIENT)
Dept: PHARMACY | Facility: CLINIC | Age: 53
End: 2023-11-30

## 2023-12-12 ENCOUNTER — SPECIALTY PHARMACY (OUTPATIENT)
Dept: PHARMACY | Facility: CLINIC | Age: 53
End: 2023-12-12

## 2023-12-12 DIAGNOSIS — G43.711 CHRONIC MIGRAINE WITHOUT AURA, WITH INTRACTABLE MIGRAINE, SO STATED, WITH STATUS MIGRAINOSUS: ICD-10-CM

## 2023-12-13 RX ORDER — FREMANEZUMAB-VFRM 225 MG/1.5ML
225 INJECTION SUBCUTANEOUS
Qty: 1.5 ML | Refills: 6 | Status: SHIPPED | OUTPATIENT
Start: 2023-12-13

## 2023-12-26 ENCOUNTER — SPECIALTY PHARMACY (OUTPATIENT)
Dept: PHARMACY | Facility: CLINIC | Age: 53
End: 2023-12-26

## 2024-01-05 ENCOUNTER — TELEPHONE (OUTPATIENT)
Dept: PRIMARY CARE | Facility: CLINIC | Age: 54
End: 2024-01-05

## 2024-01-05 DIAGNOSIS — Z00.00 WELL ADULT HEALTH CHECK: Primary | ICD-10-CM

## 2024-01-05 DIAGNOSIS — Z12.31 BREAST CANCER SCREENING BY MAMMOGRAM: ICD-10-CM

## 2024-01-09 DIAGNOSIS — Z00.00 ENCOUNTER FOR GENERAL ADULT MEDICAL EXAMINATION WITHOUT ABNORMAL FINDINGS: ICD-10-CM

## 2024-01-09 RX ORDER — SUMATRIPTAN SUCCINATE 100 MG/1
100 TABLET ORAL AS NEEDED
Qty: 27 TABLET | Refills: 1 | Status: SHIPPED | OUTPATIENT
Start: 2024-01-09

## 2024-01-22 ENCOUNTER — SPECIALTY PHARMACY (OUTPATIENT)
Dept: PHARMACY | Facility: CLINIC | Age: 54
End: 2024-01-22

## 2024-01-29 ENCOUNTER — HOSPITAL ENCOUNTER (OUTPATIENT)
Dept: RADIOLOGY | Facility: HOSPITAL | Age: 54
Discharge: HOME | End: 2024-01-29
Payer: COMMERCIAL

## 2024-01-29 DIAGNOSIS — Z00.00 WELL ADULT HEALTH CHECK: ICD-10-CM

## 2024-01-29 DIAGNOSIS — Z12.31 BREAST CANCER SCREENING BY MAMMOGRAM: ICD-10-CM

## 2024-01-29 PROCEDURE — 77067 SCR MAMMO BI INCL CAD: CPT

## 2024-01-29 PROCEDURE — 77067 SCR MAMMO BI INCL CAD: CPT | Performed by: RADIOLOGY

## 2024-01-29 PROCEDURE — 77063 BREAST TOMOSYNTHESIS BI: CPT | Performed by: RADIOLOGY

## 2024-04-09 ENCOUNTER — APPOINTMENT (OUTPATIENT)
Dept: PRIMARY CARE | Facility: CLINIC | Age: 54
End: 2024-04-09
Payer: COMMERCIAL

## 2024-05-07 ENCOUNTER — OFFICE VISIT (OUTPATIENT)
Dept: PRIMARY CARE | Facility: CLINIC | Age: 54
End: 2024-05-07
Payer: COMMERCIAL

## 2024-05-07 VITALS
WEIGHT: 140 LBS | RESPIRATION RATE: 16 BRPM | HEIGHT: 66 IN | SYSTOLIC BLOOD PRESSURE: 126 MMHG | DIASTOLIC BLOOD PRESSURE: 70 MMHG | BODY MASS INDEX: 22.5 KG/M2 | TEMPERATURE: 98 F | HEART RATE: 74 BPM | OXYGEN SATURATION: 99 %

## 2024-05-07 DIAGNOSIS — R00.2 PALPITATIONS: Primary | ICD-10-CM

## 2024-05-07 DIAGNOSIS — Z00.00 WELL ADULT EXAM: ICD-10-CM

## 2024-05-07 DIAGNOSIS — G43.711 CHRONIC MIGRAINE WITHOUT AURA, WITH INTRACTABLE MIGRAINE, SO STATED, WITH STATUS MIGRAINOSUS: ICD-10-CM

## 2024-05-07 DIAGNOSIS — Z00.00 WELL ADULT HEALTH CHECK: ICD-10-CM

## 2024-05-07 DIAGNOSIS — I34.1 MITRAL VALVE PROLAPSE, NONRHEUMATIC: ICD-10-CM

## 2024-05-07 PROBLEM — R10.2 FEMALE PELVIC PAIN: Status: RESOLVED | Noted: 2023-02-23 | Resolved: 2024-05-07

## 2024-05-07 PROBLEM — J40 BRONCHITIS: Status: RESOLVED | Noted: 2023-03-30 | Resolved: 2024-05-07

## 2024-05-07 PROBLEM — J01.90 ACUTE NON-RECURRENT SINUSITIS: Status: RESOLVED | Noted: 2023-02-23 | Resolved: 2024-05-07

## 2024-05-07 PROBLEM — R35.0 URINARY FREQUENCY: Status: RESOLVED | Noted: 2023-02-23 | Resolved: 2024-05-07

## 2024-05-07 PROBLEM — R87.810 HIGH-RISK HUMAN PAPILLOMAVIRUS (HPV) DNA DETECTED IN CERVICAL SPECIMEN: Status: ACTIVE | Noted: 2024-05-07

## 2024-05-07 PROBLEM — J18.9 PNEUMONIA OF RIGHT UPPER LOBE DUE TO INFECTIOUS ORGANISM: Status: RESOLVED | Noted: 2023-04-10 | Resolved: 2024-05-07

## 2024-05-07 PROBLEM — L65.9 HAIR LOSS: Status: RESOLVED | Noted: 2023-03-30 | Resolved: 2024-05-07

## 2024-05-07 PROBLEM — N95.1 PERIMENOPAUSE: Status: ACTIVE | Noted: 2024-05-07

## 2024-05-07 PROBLEM — D49.2 ABNORMAL SKIN GROWTH: Status: RESOLVED | Noted: 2023-02-23 | Resolved: 2024-05-07

## 2024-05-07 PROBLEM — F52.6 SEXUAL PAIN DISORDER: Status: RESOLVED | Noted: 2023-02-23 | Resolved: 2024-05-07

## 2024-05-07 PROBLEM — R53.83 OTHER FATIGUE: Status: RESOLVED | Noted: 2023-04-10 | Resolved: 2024-05-07

## 2024-05-07 PROCEDURE — 1036F TOBACCO NON-USER: CPT | Performed by: INTERNAL MEDICINE

## 2024-05-07 PROCEDURE — 99396 PREV VISIT EST AGE 40-64: CPT | Performed by: INTERNAL MEDICINE

## 2024-05-07 ASSESSMENT — PAIN SCALES - GENERAL: PAINLEVEL: 0-NO PAIN

## 2024-05-07 NOTE — PROGRESS NOTES
"Subjective   Jeana Graham is a 53 y.o. female who presents for Annual Exam.      Well Adult Physical   Patient here for a comprehensive physical exam.The patient reports no problems  Patient got blood work done at [facility] and has brought in copies of her results   Do you take any herbs or supplements that were not prescribed by a doctor? yes   Are you taking calcium supplements? yes   Are you taking aspirin daily? no     History:  LMP: No LMP recorded.  Menopause at : symptoms   Last pap date:   Abnormal pap? no  : 3  Para: 4  Mammogram: 2024  Colonoscopy: 9.            Review of Systems   All other systems reviewed and are negative.      Objective   /70   Pulse 74   Temp 36.7 °C (98 °F)   Resp 16   Ht 1.683 m (5' 6.25\")   Wt 63.5 kg (140 lb)   SpO2 99%   BMI 22.43 kg/m²       Physical Exam  Constitutional:       Appearance: Normal appearance.   HENT:      Head: Normocephalic.      Right Ear: Tympanic membrane and ear canal normal.      Left Ear: Tympanic membrane and ear canal normal.      Nose: Nose normal. No congestion or rhinorrhea.      Mouth/Throat:      Mouth: Mucous membranes are moist.      Pharynx: Oropharynx is clear. No oropharyngeal exudate or posterior oropharyngeal erythema.   Eyes:      Extraocular Movements: Extraocular movements intact.      Conjunctiva/sclera: Conjunctivae normal.      Pupils: Pupils are equal, round, and reactive to light.   Cardiovascular:      Rate and Rhythm: Normal rate and regular rhythm.      Pulses: Normal pulses.      Heart sounds: Normal heart sounds.   Pulmonary:      Effort: Pulmonary effort is normal.      Breath sounds: Normal breath sounds. No wheezing, rhonchi or rales.   Abdominal:      General: Abdomen is flat.      Palpations: Abdomen is soft.   Musculoskeletal:      Cervical back: Neck supple. No rigidity.   Lymphadenopathy:      Cervical: No cervical adenopathy.   Skin:     General: Skin is warm and dry.      " Findings: No rash.   Neurological:      Mental Status: She is alert.   Psychiatric:         Mood and Affect: Mood normal.         Behavior: Behavior normal.         Assessment/Plan   Problem List Items Addressed This Visit       Chronic migraine without aura, with intractable migraine, so stated, with status migrainosus    Mitral valve prolapse, nonrheumatic    Well adult exam    Relevant Orders    HIV 1/2 Antigen/Antibody Screen with Reflex to Confirmation    Hepatitis C antibody    Hepatitis B vaccine, adult (HEPLISAV)    Palpitations - Primary     Other Visit Diagnoses       Well adult health check        Relevant Orders    Hepatitis B vaccine, adult (HEPLISAV)          Encounter Diagnoses   Name Primary?    Palpitations Yes    Mitral valve prolapse, nonrheumatic     Well adult exam     Chronic migraine without aura, with intractable migraine, so stated, with status migrainosus     Well adult health check      Neri Chatman, DO

## 2024-05-09 DIAGNOSIS — H10.33 ACUTE CONJUNCTIVITIS OF BOTH EYES, UNSPECIFIED ACUTE CONJUNCTIVITIS TYPE: Primary | ICD-10-CM

## 2024-05-14 ENCOUNTER — APPOINTMENT (OUTPATIENT)
Dept: PRIMARY CARE | Facility: CLINIC | Age: 54
End: 2024-05-14
Payer: COMMERCIAL

## 2024-05-14 ENCOUNTER — OFFICE VISIT (OUTPATIENT)
Dept: PRIMARY CARE | Facility: CLINIC | Age: 54
End: 2024-05-14
Payer: COMMERCIAL

## 2024-05-14 VITALS
HEART RATE: 70 BPM | SYSTOLIC BLOOD PRESSURE: 114 MMHG | RESPIRATION RATE: 15 BRPM | TEMPERATURE: 98.1 F | WEIGHT: 142 LBS | DIASTOLIC BLOOD PRESSURE: 70 MMHG | BODY MASS INDEX: 22.75 KG/M2

## 2024-05-14 DIAGNOSIS — H10.33 ACUTE CONJUNCTIVITIS OF BOTH EYES, UNSPECIFIED ACUTE CONJUNCTIVITIS TYPE: ICD-10-CM

## 2024-05-14 DIAGNOSIS — J06.9 UPPER RESPIRATORY TRACT INFECTION, UNSPECIFIED TYPE: Primary | ICD-10-CM

## 2024-05-14 PROCEDURE — 1036F TOBACCO NON-USER: CPT | Performed by: NURSE PRACTITIONER

## 2024-05-14 PROCEDURE — 99213 OFFICE O/P EST LOW 20 MIN: CPT | Performed by: NURSE PRACTITIONER

## 2024-05-14 RX ORDER — TOBRAMYCIN 3 MG/ML
2 SOLUTION/ DROPS OPHTHALMIC EVERY 4 HOURS
Qty: 5 ML | Refills: 0 | Status: SHIPPED | OUTPATIENT
Start: 2024-05-14 | End: 2024-05-15 | Stop reason: ALTCHOICE

## 2024-05-14 RX ORDER — FLUTICASONE PROPIONATE 50 MCG
1 SPRAY, SUSPENSION (ML) NASAL DAILY
Qty: 16 G | Refills: 0 | Status: SHIPPED | OUTPATIENT
Start: 2024-05-14 | End: 2024-06-13

## 2024-05-14 ASSESSMENT — ENCOUNTER SYMPTOMS
EYE REDNESS: 1
COUGH: 0
NECK PAIN: 0
EYE DISCHARGE: 1
DIARRHEA: 0
RHINORRHEA: 1
EYE ITCHING: 1
WHEEZING: 0
SORE THROAT: 1
CHILLS: 0
NAUSEA: 0
VOMITING: 0
FEVER: 0
APPETITE CHANGE: 0
HEADACHES: 1
SHORTNESS OF BREATH: 0

## 2024-05-14 NOTE — PROGRESS NOTES
Subjective   Patient ID: Jeana Graham is a 53 y.o. female who presents for Conjunctivitis.    Right eye was irritated on Sunday. The eye was crusted and had drainage. Yesterday, her left eye was crusted. Patient has a runny nose that started over the weekend. Her ear has been popping. She has a headache as well. Not vaccinated against COVID. Pt declines the need for COVID testing.     Review of Systems   Constitutional:  Negative for appetite change, chills and fever.   HENT:  Positive for congestion, rhinorrhea and sore throat. Negative for ear pain.    Eyes:  Positive for discharge, redness and itching.   Respiratory:  Negative for cough, shortness of breath and wheezing.    Cardiovascular:  Negative for chest pain.   Gastrointestinal:  Negative for diarrhea, nausea and vomiting.   Musculoskeletal:  Negative for neck pain.   Skin:  Negative for rash.   Neurological:  Positive for headaches.     Objective   /70   Pulse 70   Temp 36.7 °C (98.1 °F) (Temporal)   Resp 15   Wt 64.4 kg (142 lb)   BMI 22.75 kg/m²     Physical Exam  Vitals reviewed.   Constitutional:       General: She is not in acute distress.     Appearance: Normal appearance. She is not toxic-appearing.   HENT:      Head: Atraumatic.      Right Ear: Ear canal and external ear normal. A middle ear effusion is present.      Left Ear: Ear canal and external ear normal. A middle ear effusion is present.      Nose: Congestion and rhinorrhea present.      Mouth/Throat:      Pharynx: Posterior oropharyngeal erythema present. No oropharyngeal exudate.      Comments: Tonsils normal, uvula midline  Eyes:      General: Lids are normal.      Extraocular Movements: Extraocular movements intact.      Conjunctiva/sclera:      Right eye: Right conjunctiva is injected. Exudate present.      Left eye: Left conjunctiva is injected. Exudate present.      Pupils: Pupils are equal, round, and reactive to light.   Cardiovascular:      Rate and Rhythm: Normal rate  and regular rhythm.      Heart sounds: Normal heart sounds. No murmur heard.  Pulmonary:      Effort: Pulmonary effort is normal.      Breath sounds: Normal breath sounds. No wheezing, rhonchi or rales.   Chest:      Chest wall: No tenderness.   Musculoskeletal:         General: Normal range of motion.   Skin:     General: Skin is warm and dry.   Neurological:      General: No focal deficit present.      Mental Status: She is alert.   Psychiatric:         Mood and Affect: Mood normal.     Assessment/Plan   Problem List Items Addressed This Visit    None  Visit Diagnoses         Codes    Upper respiratory tract infection, unspecified type    -  Primary J06.9    Relevant Medications    fluticasone (Flonase) 50 mcg/actuation nasal spray    Acute conjunctivitis of both eyes, unspecified acute conjunctivitis type     H10.33    Relevant Medications    tobramycin (Tobrex) 0.3 % ophthalmic solution        Patient will begin using antibiotic eye drops as prescribed.  Advised may use warm, moist compresses for eye drainage, crusting.  Avoid contacts while on eye drops. Wash hands frequently.  Advised may be contagious until 24 hours on eye drops. Patient advised to go to the ER for any worsening eye redness, crusting, drainage or new/concerning symptoms; she agreed. Call office if symptoms not improving after additional 2-3 days.     Patient with URI symptoms that started this weekend. Discussed difference between viral and bacterial infections. Patient declined any COVID, flu or strep testing. Advised patient on use of humidifier and hot steam treatments. Discussed that patient is to drink plenty of fluids and stay well hydrated. Can take tylenol or motrin as needed for any fevers or discomfort. Patient can use mucinex and flonase as well. Discussed that patient is to go to the ER for any chest pain, difficulty breathing, shortness of breath or new/concerning symptoms; she agreed. Pt to follow up in 2-3 days if symptoms  persist.     Of note, patient states that she is visiting a family member who is immunocompromised this weekend. I advised that patient should not visit this person if she is feeling ill or having COVID testing. I suggested COVID testing here today but pt declined stating she has tests at home. I advised to continue symptomatic care at this time as symptoms are likely related to a virus. She will follow up with us if symptoms persist.

## 2024-05-15 RX ORDER — TOBRAMYCIN 3 MG/ML
2 SOLUTION/ DROPS OPHTHALMIC EVERY 4 HOURS
Qty: 5 ML | Refills: 1 | Status: SHIPPED | OUTPATIENT
Start: 2024-05-15 | End: 2024-05-22

## 2024-05-17 ENCOUNTER — SPECIALTY PHARMACY (OUTPATIENT)
Dept: PHARMACY | Facility: CLINIC | Age: 54
End: 2024-05-17

## 2024-05-21 ENCOUNTER — SPECIALTY PHARMACY (OUTPATIENT)
Dept: PHARMACY | Facility: CLINIC | Age: 54
End: 2024-05-21

## 2024-06-11 ENCOUNTER — APPOINTMENT (OUTPATIENT)
Dept: PRIMARY CARE | Facility: CLINIC | Age: 54
End: 2024-06-11
Payer: COMMERCIAL

## 2024-06-11 DIAGNOSIS — M25.561 ACUTE PAIN OF RIGHT KNEE: ICD-10-CM

## 2024-06-12 ENCOUNTER — HOSPITAL ENCOUNTER (OUTPATIENT)
Dept: RADIOLOGY | Facility: CLINIC | Age: 54
Discharge: HOME | End: 2024-06-12
Payer: COMMERCIAL

## 2024-06-12 ENCOUNTER — OFFICE VISIT (OUTPATIENT)
Dept: ORTHOPEDIC SURGERY | Facility: CLINIC | Age: 54
End: 2024-06-12
Payer: COMMERCIAL

## 2024-06-12 DIAGNOSIS — M25.561 RIGHT KNEE PAIN, UNSPECIFIED CHRONICITY: ICD-10-CM

## 2024-06-12 DIAGNOSIS — S76.311A PARTIAL TEAR OF RIGHT HAMSTRING: Primary | ICD-10-CM

## 2024-06-12 PROCEDURE — 73564 X-RAY EXAM KNEE 4 OR MORE: CPT | Mod: RT

## 2024-06-12 PROCEDURE — 73564 X-RAY EXAM KNEE 4 OR MORE: CPT | Mod: RIGHT SIDE | Performed by: RADIOLOGY

## 2024-06-12 PROCEDURE — 99203 OFFICE O/P NEW LOW 30 MIN: CPT | Performed by: EMERGENCY MEDICINE

## 2024-06-12 RX ORDER — MELOXICAM 15 MG/1
15 TABLET ORAL DAILY
Qty: 30 TABLET | Refills: 0 | Status: SHIPPED | OUTPATIENT
Start: 2024-06-12 | End: 2024-07-12

## 2024-06-12 ASSESSMENT — PAIN SCALES - GENERAL: PAINLEVEL_OUTOF10: 2

## 2024-06-12 ASSESSMENT — PAIN - FUNCTIONAL ASSESSMENT: PAIN_FUNCTIONAL_ASSESSMENT: 0-10

## 2024-06-12 NOTE — PROGRESS NOTES
Subjective    Patient ID: Jeana Graham is a 53 y.o. female.    Chief Complaint: Pain of the Right Knee (NPV - back of right knee pain. Felt a pop on Friday. )     Last Surgery: No surgery found  Last Surgery Date: No surgery found    Patient is an extremely pleasant 53-year-old female coming in with some acute right knee discomfort.  She says that she was out in [place] hiking about 2 weeks ago and actually stumbled and twisted her leg slightly.  She nearly fell but her  helped catcher.  She did not really think anything of it and did not really have any symptoms afterwards.  When she came back home she was kneeling on Friday and when she stood up she felt a popping sensation in the posterior lateral aspect of the right knee.  Date of injury was 6/7/2024.  She was seen and evaluated by her PCP and then referred here. Referred by Dr. Chatman.  Today her pain is extremely mild.  It is mostly located over the distal hamstring area on the posterior lateral aspect of the right knee.  She has no knee swelling.  She is able to bear weight.  She feels like when she stretches her knee out in full extension her symptoms get a little bit worse.  She does think that she has been improving since Friday.        Objective   Right Knee Exam     Tenderness   Right knee tenderness location: Mild tenderness to palpation over the distal hamstring insertion posterior laterally near the calf muscle.    Range of Motion   The patient has normal right knee ROM.    Tests   Ester:  Medial - negative Lateral - negative  Varus: negative Valgus: negative  Lachman:  Anterior - negative      Drawer:  Anterior - negative    Posterior - negative    Other   Erythema: absent  Sensation: normal  Pulse: present  Swelling: none  Effusion: no effusion present    Comments:  Knee flexion is slightly painful against resistance.  Strength 4+ out of 5.  Extension intact 5 out of 5      Left Knee Exam   Left knee exam is  normal.            Image Results:  X-rays of the right knee were reviewed and interpreted by me on 6/12/2024 and were grossly unremarkable aside from some mild degenerative changes.  No evidence of acute injuries or fractures.    Assessment/Plan   Encounter Diagnoses:  Partial tear of right hamstring    Right knee pain, unspecified chronicity    Orders Placed This Encounter    XR knee right 4+ views    Referral to Physical Therapy    meloxicam (Mobic) 15 mg tablet     No follow-ups on file.    We discussed her treatment options and agreed to start her on daily meloxicam for the next 3 to 4 weeks.  She will avoid other NSAIDs during this time.  I am also going to send her to physical therapy with an emphasis on developing and implementing a home exercise program.  She will follow-up with me in about 4 weeks to determine her response to this plan and if she is not better at that time we could potentially consider knee injections versus obtaining more imaging such as an MRI but right now my concern for surgical pathology is extremely low.    ** Please excuse any errors in grammar or translation related to this dictation. Voice recognition software was utilized to prepare this document. **       Solo Ortiz MD  [facility]

## 2024-06-17 ENCOUNTER — EVALUATION (OUTPATIENT)
Dept: PHYSICAL THERAPY | Facility: CLINIC | Age: 54
End: 2024-06-17
Payer: COMMERCIAL

## 2024-06-17 DIAGNOSIS — S76.311A PARTIAL TEAR OF RIGHT HAMSTRING: ICD-10-CM

## 2024-06-17 DIAGNOSIS — S76.311D HAMSTRING MUSCLE STRAIN, RIGHT, SUBSEQUENT ENCOUNTER: Primary | ICD-10-CM

## 2024-06-17 PROCEDURE — 97535 SELF CARE MNGMENT TRAINING: CPT | Mod: GP | Performed by: PHYSICAL THERAPIST

## 2024-06-17 PROCEDURE — 97140 MANUAL THERAPY 1/> REGIONS: CPT | Mod: GP | Performed by: PHYSICAL THERAPIST

## 2024-06-17 PROCEDURE — 97161 PT EVAL LOW COMPLEX 20 MIN: CPT | Mod: GP | Performed by: PHYSICAL THERAPIST

## 2024-06-17 ASSESSMENT — PAIN SCALES - GENERAL: PAINLEVEL_OUTOF10: 0 - NO PAIN

## 2024-06-17 ASSESSMENT — PAIN - FUNCTIONAL ASSESSMENT: PAIN_FUNCTIONAL_ASSESSMENT: 0-10

## 2024-06-17 ASSESSMENT — PAIN DESCRIPTION - DESCRIPTORS: DESCRIPTORS: ACHING

## 2024-06-17 NOTE — PROGRESS NOTES
PT Initial Evaluation    Patient Name:  Jeana Graham    MRN:  [id number]    :  1970    Today's Date:  24    Time Calculation  Start Time: 1515  Stop Time: 1558  Time Calculation (min): 43 min  PT Evaluation Time Entry  PT Evaluation (Low) Time Entry: 15  PT Therapeutic Procedures Time Entry  Manual Therapy Time Entry: 8  Self-Care/Home Mgmt Training: 15     Informed Consent  Patient has been informed of all evaluation findings and treatment plans and agrees to participate in Physical Therapy services and plans as outlined.    Diagnosis:  Diagnosis and Precautions: S76.311D R hamstring muscle strain    Goals:   By the end of 6 visits patient will be able to do the following with < 0/10 R KNEE pain:    HEP:  Patient will consistently perform HER home exercise program for 20-30 minute sessions, 1-2x/day, 3-4 days/week independently by the end of 6 visits.    Basic ADL's:   Patient will perform bADL's/instrumental ADL's for 30 minutes moving between various closed kinetic chain postures.    ROM and Strength:  Patient will demonstrate 5/5 R KNEE strength in all planes and WNL's R KNEE AROM in all planes to improve their ability to lift, stand, ambulate and perform basic ADL's.    Stair Negotiation:  Patient will be able to ambulate up/down stairs for 1-2 flights at a time.    Gait/Locomotion:  Patient will be able to ambulate for 30-60 minutes at a time.  Minimal to no gait deviation across level ground/stairs.  Patient will demonstrate no R KNEE pain with the following movements:  partial squat, lunge, forward/lateral step-up, HR, stepdown and SLS.    WORK:  Patient will return to WORK and perform normal WORK activities pain free.    Sleep:  Patient will sleep thru the night 4/7 nights/week.    Participation restrictions:  Increase LEFS to > or = to 70/80 for increased functional ability.    Pain:  Decrease pain at worst to < or = to 0/10 for improved QOL and ability to sleep.    No point tenderness noted  over the R posterolateral knee/calf.     Plan of Care:      Treatment/Interventions: Cryotherapy, Dry needling, Education/ Instruction, Electrical stimulation, Gait training, Manual therapy, Neuromuscular re-education, Self care/ home management, Taping techniques, Therapeutic activities, Therapeutic exercises, Ultrasound, Vasopneumatic device  PT Plan: Skilled PT  PT Frequency: 1 time per week  Duration: 6 visits  Onset Date: 06/07/24  Certification Period Start Date: 06/17/24  Certification Period End Date: 09/15/24  Number of Treatments Authorized: 6  Rehab Potential: Good  Plan of Care Agreement: Patient    PT Assessment:    Patient is a 53 y.o. FEMALE with c/o R posterolateral knee/calf pain.   Patient is alert and oriented x 3.  Patient presents with medical diagnosis of S76.311D R hamstring muscle strain  contributing to compensatory soft tissue dysfunction, pain, stiffness and weakness of the R knee.   Significant past medical history/past surgical history includes see above.    Skilled care is needed to progress the patient back to these activities without exacerbating symptoms.   Patient requires skilled PT services to address the problems identified and the individualized patient's goals as outlined in the problems and goals section of this evaluation.  A skilled PT is required to address these key impairments and to provide and progress with an appropriate home exercise program. Patient does have any significant PMH influencing Rx and reports motivation to return to FUNCTIONAL ACTIVITY.   Patient demonstrates to be a good candidate for physical therapy with good rehab potential and verbalized a good understanding of HER diagnosis, prognosis and treatment.  Goals have been established and reviewed with the patient.      PT Assessment Results: Decreased strength, Decreased range of motion, Decreased endurance, Impaired balance, Decreased mobility, Pain  Rehab Prognosis: Good  Evaluation/Treatment  Tolerance: Patient tolerated treatment well    Diagnosis:  S76.311D R hamstring muscle strain     Complexity:  Low complexity evaluation  due to a 15 minute duration, a past medical history WITH any personal factors and/or comorbidities that could impact the POC, examination of body systems completed on one to two elements, the patient presents with a stable condition, and clinical decision making using the LEFS was of low complexity.     Prognosis:  Rehab Prognosis: Good    Problem List  Activity Limitations, Decreased Functional Level, Decreased knowledge of HEP, Flexibility, Gait issues, Pain, Range of Motion/joint mobility issues, Strength, and Endurance    Impairments   IMPAIRED ROM LOWER BODY, IMPAIRED STRENGTH LOWER BODY, IMPAIRED GAIT, IMPAIRED CORE STABILITY, INCREASED PAIN, IMPAIRED FUNCTIONAL ACTIVITY LEVEL, and IMPAIRED FUNCTIONAL MOVEMENT PATTERNS    Functional Limitations:  LIMITATIONS PERFORMING BASIC ADL'S, PARTICIPATION IN HOBBIES, PARTICIPATION IN LEISURE ACTIVITIES, and PARTICIPATION IN HOME MANAGEMENT    General Visit Information:  Reason for Referral: PT Evaluate and Treat  Referred By: Dr. Solo Ortiz  General Comment: S76.311D R hamstring muscle strain    Pre-Cautions:  STEADI Fall Risk Score (The score of 4 or more indicates an increased risk of falling): 0     Medical Precautions:  (headaches, migraines, anxiety, C-sections, hernia repair 2005))     Reason for Visit:  PT Evaluate and Treat    Initial Evaluation:  Referred By: Dr. Solo Ortiz    Insurance  Insurance reviewed  Name of Insurance:  G[employer]  Visit No.  1  * (EVAL) PARTIAL TEAR OF RIGHT HAMSTRING S76.311A; 7000/14,000 DED (5,578.19 REMAINING) / 7000/14,000 OOP / 0% COINSUR / UNLIM V BMN / PA IS NOT REQ PER [email or web address] [id number]     Subjective:    Current Episode  Date of Onset:  6/7/2024  Chief Complaint:  Weakness, mild pain of the R knee.  Mechanism of injury:  Patient reports that on 6/7/24 she was cleaning the tub  when she stood up and felt a pop in the back of her R knee.  Patient does report doing a lot of hiking the previous few days.  Progression of symptoms:  Improved    Pain Score:  Pain Assessment: 0-10  Pain Assessment  Pain Assessment: 0-10  Pain Score: 0 - No pain (1/10 worst)  Pain Type: Acute pain  Pain Location: Knee  Pain Orientation: Right, Posterior  Pain Descriptors: Aching  Pain Frequency: Intermittent  Pain Type: Acute pain  Pain Location: Knee  Pain Orientation: Right, Posterior  Pain Descriptors: Aching  Pain Frequency: Intermittent    Better with:  hot tub    Worse with:  stairs, squatting, walking, getting in/out of car, kneeling    Medical History/Surgical History:  Medical Precautions:  (headaches, migraines, anxiety, C-sections, hernia repair 2005)    Reviewed medical history form with patient (medications/allergies reviewed with patient).  Current Outpatient Medications   Medication Instructions    Ajovy Autoinjector 225 mg, subcutaneous, Every 30 days    apple cider vinegar 600 mg capsule 1 capsule, oral, Daily    ASPIRIN-ACETAMINOPHEN-CAFFEINE ORAL 2 tablets, oral, Daily    bacillus coagulans-inulin 1 billion-250 cell-mg capsule 1 capsule, oral, Daily    biotin 300 mcg tablet No dose, route, or frequency recorded.    candesartan (ATACAND) 16 mg, oral, Daily    cholecalciferol (Vitamin D-3) 5,000 Units tablet 1 tablet, oral, Daily    coenzyme Q-10 100 mg capsule 1 capsule, oral, Daily    fluticasone (Flonase) 50 mcg/actuation nasal spray 1 spray, Each Nostril, Daily, Shake gently. Before first use, prime pump. After use, clean tip and replace cap.    garlic (garlic oiL) 1,000 mg capsule 1 capsule, oral, Daily    magnesium oxide (MAG-CAPS ORAL) 1 capsule, oral, Daily    meloxicam (MOBIC) 15 mg, oral, Daily    mv-calcium-min-iron fm-FA-vitK (Multi For Her) 18 mg iron-600 mcg-80 mcg tablet 1 tablet, oral, Daily    SUMAtriptan (IMITREX) 100 mg, oral, As needed, take 1 tablet at onset of migraine. May  repeat in 2 hours if needed    TURMERIC ORAL 1 capsule, oral, Daily    vit B1 mn/B2/B3/B5/B6/B12/C/FA (B COMPLEX W-VIT C ORAL) 1 tablet, oral, Daily, As directed    vitamin E acetate (VITAMIN E ORAL) No dose, route, or frequency recorded.     Radiology:  Exam Information    Status Exam Begun Exam Ended   Final 6/12/2024 15:59 6/12/2024 16:05     Study Result    Narrative & Impression   Interpreted By:  Edilson Cole,   STUDY:  XR KNEE RIGHT 4+ VIEWS; ;  6/12/2024 4:05 pm      INDICATION:  Signs/Symptoms:pain.      COMPARISON:  None.      ACCESSION NUMBER(S):  [id number]      ORDERING CLINICIAN:  SHIMA BELTRÁN      FINDINGS:  Right knee, four views      There is no fracture. No effusion seen. There is no dislocation.  There are no degenerative changes. There is no lytic or sclerotic  lesion. There is no soft tissue abnormality seen.      IMPRESSION:  Normal radiographs of the right knee     Functional Assessment:  Level of [place]:  Level of [place]: Independent with ADLs and functional transfers    Social History:    Occupation:  Practice Manager  Work Status:  EMPLOYED  Patient Awareness:  Patient is aware of HER diagnosis and prognosis.  Social Support/History:  LIVES WITH FAMILY    Objective:    Weightbearing Status:  WBAT    Skin:  skin intact over R posterior knee/calf    Palpation:   point tenderness over R lateral hamstring, R lateral gastrocnemius    Sensation:  Patient denies numbness/tingling of bilateral LOWER extremities.    Gait:  Mild antalgic gait R LE    ROM:  AROM  R knee flexion/extension AROM -5-125 degrees  L knee AROM WNL's, R hip AROM WNL's, L hip AROM WNL's, R ankle/foot AROM WNL's, and L ankle/foot AROM WNL's    Strength:    R knee 4-/5 each  L knee 5/5 all planes, R hip 5/5 all planes, L hip 5/5 all planes, R ankle/foot 5/5 all planes, and L ankle/foot 5/5 all planes    Special Tests:  R knee- negative anterior drawer, negative posterior drawer, negative valgus stress,  negative varus stress, increased R knee pain with overpressure R knee flexion    Outcome measure  Lower Extremity Funtional Score (LEFS): 55/80     Treatment  Time in clinic started at  3:15pm  Time in clinic ended at  3:58pm  Total time in clinic is . 43 minutes  Total timed code time is  38 minutes    Treatment Performed Today:.   PT Initial Evaluation, Manual Therapy, and Self-Care/Home Management HEP  Individual(s) Educated: Patient  Education Provided: Home Exercise Program  Diagnosis and Precautions: S76.311D R hamstring muscle strain  Risk and Benefits Discussed with Patient/Caregiver/Other: yes  Patient/Caregiver Demonstrated Understanding: yes  Plan of Care Discussed and Agreed Upon: yes  Patient Response to Education: Patient/Caregiver Verbalized Understanding of Information, Patient/Caregiver Performed Return Demonstration of Exercises/Activities    Soft tissue work R lateral hamstring, R lateral calf    Patient educated as to benefits of soft tissue mobilization/instrument assisted soft tissue mobilization which includes decreased pain, improved soft tissue mobility/ROM/strength, reduced scar tissue, increased vascular response, allows for faster rehab time/recovery, patients can continue to engage in functional activities, and resolves chronic conditions thought to be permanent.  Patient educated as to they may experience redness or even possible bruising after completion of soft tissue work/instrument assisted soft tissue mobilization.  Patient encouraged to ice 2-3x/day for the next 2-3 days upon completion of treatment secondary to possible muscle/joint soreness.     Patient instructed in a home exercise program, has been given handouts for each of the exercises performed and was given another sheet instructing patient in the amount of reps to perform and the adeline to follow while doing the exercises     Access Code: [email or web address]  URL: [email or web address]  Date:  06/17/2024  Prepared by: Sloan Goins    Exercises  - Long Sitting Calf Stretch with Strap  - 1 x daily - 3-4 x weekly - 1 sets - 5 reps - 30 hold  - Long Sitting Soleus Stretch on Bolster with Strap  - 1 x daily - 3-4 x weekly - 1 sets - 5 reps - 30 hold  - Supine Hamstring Stretch  - 1 x daily - 3-4 x weekly - 1 sets - 5 reps - 30 hold  - Long Sitting Hamstring Set  - 1 x daily - 3-4 x weekly - 2 sets - 10 reps - 5-10 hold  - Supine Bridge  - 1 x daily - 3-4 x weekly - 2 sets - 10 reps - 5-10 hold

## 2024-06-17 NOTE — LETTER
June 17, 2024    Sloan Goins, PT  [address] [place], [facility] [address]  [facility] [address]    Patient: Jeana Graham   YOB: 1970   Date of Visit: 6/17/2024       Dear Solo Ortiz MD  [id number] [facility]  Department [place] [address]    The attached plan of care is being sent to you because your patient’s medical reimbursement requires that you certify the plan of care. Your signature is required to allow uninterrupted insurance coverage.      You may indicate your approval by signing below and faxing this form back to us at Dept Fax: [phone].    Please call Dept: [phone] with any questions or concerns.    Thank you for this referral,        Sloan Goins, PT  [place] [address] [facility]  [address] [facility] [address]    Payer: Payor: CIGNA / Plan: C[employer] HEALTH PLAN / Product Type: *No Product type* /                                                                         Date:     Dear Sloan Goins, PT,     Re: Ms. Jeana Graham, MRN:[id number]    I certify that I have reviewed the attached plan of care and it is medically necessary for Ms. Jeana Graham (1970) who is under my care.          ______________________________________                    _________________  Provider name and credentials                                           Date and time                                                                                           Plan of Care 6/17/24   Effective from: 6/17/2024  Effective to: 9/15/2024    Plan ID: [id number]            Participants as of Finalize on 6/17/2024    Name Type Comments Contact Info    Solo Ortiz MD Referring Provider  [phone]    Sloan Goins, PT Physical Therapist  [phone]       Last Plan Note     Author: Sloan Goins PT Status: Incomplete Last edited: 6/17/2024  3:15 PM       PT Initial Evaluation    Patient Name:   Jeana Graham    MRN:  [id number]    :  1970    Today's Date:  24    Time Calculation  Start Time: 1515  Stop Time: 1558  Time Calculation (min): 43 min  PT Evaluation Time Entry  PT Evaluation (Low) Time Entry: 15  PT Therapeutic Procedures Time Entry  Manual Therapy Time Entry: 8  Self-Care/Home Mgmt Training: 15     Informed Consent  Patient has been informed of all evaluation findings and treatment plans and agrees to participate in Physical Therapy services and plans as outlined.    Diagnosis:  Diagnosis and Precautions: S76.311D R hamstring muscle strain    Goals:   By the end of 6 visits patient will be able to do the following with < 0/10 R KNEE pain:    HEP:  Patient will consistently perform HER home exercise program for 20-30 minute sessions, 1-2x/day, 3-4 days/week independently by the end of 6 visits.    Basic ADL's:   Patient will perform bADL's/instrumental ADL's for 30 minutes moving between various closed kinetic chain postures.    ROM and Strength:  Patient will demonstrate 5/5 R KNEE strength in all planes and WNL's R KNEE AROM in all planes to improve their ability to lift, stand, ambulate and perform basic ADL's.    Stair Negotiation:  Patient will be able to ambulate up/down stairs for 1-2 flights at a time.    Gait/Locomotion:  Patient will be able to ambulate for 30-60 minutes at a time.  Minimal to no gait deviation across level ground/stairs.  Patient will demonstrate no R KNEE pain with the following movements:  partial squat, lunge, forward/lateral step-up, HR, stepdown and SLS.    WORK:  Patient will return to WORK and perform normal WORK activities pain free.    Sleep:  Patient will sleep thru the night 4/7 nights/week.    Participation restrictions:  Increase LEFS to > or = to 70/80 for increased functional ability.    Pain:  Decrease pain at worst to < or = to 0/10 for improved QOL and ability to sleep.    No point tenderness noted over the R posterolateral knee/calf.      Plan of Care:      Treatment/Interventions: Cryotherapy, Dry needling, Education/ Instruction, Electrical stimulation, Gait training, Manual therapy, Neuromuscular re-education, Self care/ home management, Taping techniques, Therapeutic activities, Therapeutic exercises, Ultrasound, Vasopneumatic device  PT Plan: Skilled PT  PT Frequency: 1 time per week  Duration: 6 visits  Onset Date: 06/07/24  Certification Period Start Date: 06/17/24  Certification Period End Date: 09/15/24  Number of Treatments Authorized: 6  Rehab Potential: Good  Plan of Care Agreement: Patient    PT Assessment:    Patient is a 53 y.o. FEMALE with c/o R posterolateral knee/calf pain.   Patient is alert and oriented x 3.  Patient presents with medical diagnosis of S76.311D R hamstring muscle strain  contributing to compensatory soft tissue dysfunction, pain, stiffness and weakness of the ***.   Significant past medical history/past surgical history includes see above.    Skilled care is needed to progress the patient back to these activities without exacerbating symptoms.   Patient requires skilled PT services to address the problems identified and the individualized patient's goals as outlined in the problems and goals section of this evaluation.  A skilled PT is required to address these key impairments and to provide and progress with an appropriate home exercise program. Patient does have any significant PMH influencing Rx and reports motivation to return to FUNCTIONAL ACTIVITY.   Patient demonstrates to be a good candidate for physical therapy with good rehab potential and verbalized a good understanding of HER diagnosis, prognosis and treatment.  Goals have been established and reviewed with the patient.      PT Assessment Results: Decreased strength, Decreased range of motion, Decreased endurance, Impaired balance, Decreased mobility, Pain  Rehab Prognosis: Good  Evaluation/Treatment Tolerance: Patient tolerated treatment  well    Diagnosis:  S76.311D R hamstring muscle strain     Complexity:  Low complexity evaluation  due to a 15 minute duration, a past medical history WITH any personal factors and/or comorbidities that could impact the POC, examination of body systems completed on one to two elements, the patient presents with a stable condition, and clinical decision making using the LEFS was of low complexity.     Prognosis:  Rehab Prognosis: Good    Problem List  Activity Limitations, Decreased Functional Level, Decreased knowledge of HEP, Flexibility, Gait issues, Pain, Range of Motion/joint mobility issues, Strength, and Endurance    Impairments   IMPAIRED ROM LOWER BODY, IMPAIRED STRENGTH LOWER BODY, IMPAIRED GAIT, IMPAIRED CORE STABILITY, INCREASED PAIN, IMPAIRED FUNCTIONAL ACTIVITY LEVEL, and IMPAIRED FUNCTIONAL MOVEMENT PATTERNS    Functional Limitations:  LIMITATIONS PERFORMING BASIC ADL'S, PARTICIPATION IN HOBBIES, PARTICIPATION IN LEISURE ACTIVITIES, and PARTICIPATION IN HOME MANAGEMENT    General Visit Information:  Reason for Referral: PT Evaluate and Treat  Referred By: Dr. Solo Ortiz  General Comment: S76.311D R hamstring muscle strain    Pre-Cautions:  STEADI Fall Risk Score (The score of 4 or more indicates an increased risk of falling): 0     Medical Precautions:  (headaches, migraines, anxiety, C-sections, hernia repair 2005))     Reason for Visit:  PT Evaluate and Treat    Initial Evaluation:  Referred By: Dr. Solo Ortiz    Insurance  Insurance reviewed  Name of Insurance:  [employer]  Visit No.  1  * (EVAL) PARTIAL TEAR OF RIGHT HAMSTRING S76.311A; 7000/14,000 DED (5,578.19 REMAINING) / 7000/14,000 OOP / 0% COINSUR / UNLIM V BMN / PA IS NOT REQ PER [email or web address] [id number]     Subjective:    Current Episode  Date of Onset:  6/7/2024  Chief Complaint:  Weakness, mild pain of the R knee.  Mechanism of injury:  Patient reports that on 6/7/24 she was cleaning the tub when she stood up and felt a pop in  the back of her R knee.  Patient does report doing a lot of hiking the previous few days.  Progression of symptoms:  Improved    Pain Score:  Pain Assessment: 0-10  Pain Assessment  Pain Assessment: 0-10  Pain Score: 0 - No pain (1/10 worst)  Pain Type: Acute pain  Pain Location: Knee  Pain Orientation: Right, Posterior  Pain Descriptors: Aching  Pain Frequency: Intermittent  Pain Type: Acute pain  Pain Location: Knee  Pain Orientation: Right, Posterior  Pain Descriptors: Aching  Pain Frequency: Intermittent    Better with:  hot tub    Worse with:  stairs, squatting, walking, getting in/out of car, kneeling    Medical History/Surgical History:  Medical Precautions:  (headaches, migraines, anxiety, C-sections, hernia repair 2005)    Reviewed medical history form with patient (medications/allergies reviewed with patient).  Current Outpatient Medications   Medication Instructions   • Ajovy Autoinjector 225 mg, subcutaneous, Every 30 days   • apple cider vinegar 600 mg capsule 1 capsule, oral, Daily   • ASPIRIN-ACETAMINOPHEN-CAFFEINE ORAL 2 tablets, oral, Daily   • bacillus coagulans-inulin 1 billion-250 cell-mg capsule 1 capsule, oral, Daily   • biotin 300 mcg tablet No dose, route, or frequency recorded.   • candesartan (ATACAND) 16 mg, oral, Daily   • cholecalciferol (Vitamin D-3) 5,000 Units tablet 1 tablet, oral, Daily   • coenzyme Q-10 100 mg capsule 1 capsule, oral, Daily   • fluticasone (Flonase) 50 mcg/actuation nasal spray 1 spray, Each Nostril, Daily, Shake gently. Before first use, prime pump. After use, clean tip and replace cap.   • garlic (garlic oiL) 1,000 mg capsule 1 capsule, oral, Daily   • magnesium oxide (MAG-CAPS ORAL) 1 capsule, oral, Daily   • meloxicam (MOBIC) 15 mg, oral, Daily   • mv-calcium-min-iron fm-FA-vitK (Multi For Her) 18 mg iron-600 mcg-80 mcg tablet 1 tablet, oral, Daily   • SUMAtriptan (IMITREX) 100 mg, oral, As needed, take 1 tablet at onset of migraine. May repeat in 2 hours if  needed   • TURMERIC ORAL 1 capsule, oral, Daily   • vit B1 mn/B2/B3/B5/B6/B12/C/FA (B COMPLEX W-VIT C ORAL) 1 tablet, oral, Daily, As directed   • vitamin E acetate (VITAMIN E ORAL) No dose, route, or frequency recorded.     Radiology:  Exam Information    Status Exam Begun Exam Ended   Final 6/12/2024 15:59 6/12/2024 16:05     Study Result    Narrative & Impression   Interpreted By:  Edilson Cole,   STUDY:  XR KNEE RIGHT 4+ VIEWS; ;  6/12/2024 4:05 pm      INDICATION:  Signs/Symptoms:pain.      COMPARISON:  None.      ACCESSION NUMBER(S):  [id number]      ORDERING CLINICIAN:  SHIMA BELTRÁN      FINDINGS:  Right knee, four views      There is no fracture. No effusion seen. There is no dislocation.  There are no degenerative changes. There is no lytic or sclerotic  lesion. There is no soft tissue abnormality seen.      IMPRESSION:  Normal radiographs of the right knee     Functional Assessment:  Level of Perkins:  Level of Perkins: Independent with ADLs and functional transfers    Social History:    Occupation:  Practice Manager  Work Status:  EMPLOYED  Patient Awareness:  Patient is aware of HER diagnosis and prognosis.  Social Support/History:  LIVES WITH FAMILY    Objective:    Weightbearing Status:  WBAT    Skin:  skin intact over R posterior knee/calf    Palpation:   point tenderness over R lateral hamstring, R lateral gastrocnemius    Sensation:  Patient denies numbness/tingling of bilateral LOWER extremities.    Gait:  Mild antalgic gait R LE    ROM:  AROM  R knee flexion/extension AROM -5-125 degrees  L knee AROM WNL's, R hip AROM WNL's, L hip AROM WNL's, R ankle/foot AROM WNL's, and L ankle/foot AROM WNL's    Strength:    R knee 4-/5 each  L knee 5/5 all planes, R hip 5/5 all planes, L hip 5/5 all planes, R ankle/foot 5/5 all planes, and L ankle/foot 5/5 all planes    Special Tests:  R knee- negative anterior drawer, negative posterior drawer, negative valgus stress, negative varus stress,  increased R knee pain with overpressure R knee flexion    Outcome measure  Lower Extremity Funtional Score (LEFS): 55/80     Treatment  Time in clinic started at  3:15pm  Time in clinic ended at  3:58pm  Total time in clinic is . 43 minutes  Total timed code time is  38 minutes    Treatment Performed Today:.   PT Initial Evaluation, Manual Therapy, and Self-Care/Home Management HEP  Individual(s) Educated: Patient  Education Provided: Home Exercise Program  Diagnosis and Precautions: S76.311D R hamstring muscle strain  Risk and Benefits Discussed with Patient/Caregiver/Other: yes  Patient/Caregiver Demonstrated Understanding: yes  Plan of Care Discussed and Agreed Upon: yes  Patient Response to Education: Patient/Caregiver Verbalized Understanding of Information, Patient/Caregiver Performed Return Demonstration of Exercises/Activities    Soft tissue work R lateral hamstring, R lateral calf    Patient educated as to benefits of soft tissue mobilization/instrument assisted soft tissue mobilization which includes decreased pain, improved soft tissue mobility/ROM/strength, reduced scar tissue, increased vascular response, allows for faster rehab time/recovery, patients can continue to engage in functional activities, and resolves chronic conditions thought to be permanent.  Patient educated as to they may experience redness or even possible bruising after completion of soft tissue work/instrument assisted soft tissue mobilization.  Patient encouraged to ice 2-3x/day for the next 2-3 days upon completion of treatment secondary to possible muscle/joint soreness.     Patient instructed in a home exercise program, has been given handouts for each of the exercises performed and was given another sheet instructing patient in the amount of reps to perform and the adeline to follow while doing the exercises     Access Code: [email or web address]  URL: [email or web address]  Date: 06/17/2024  Prepared by: Sloan  Buster    Exercises  - Long Sitting Calf Stretch with Strap  - 1 x daily - 3-4 x weekly - 1 sets - 5 reps - 30 hold  - Long Sitting Soleus Stretch on Bolster with Strap  - 1 x daily - 3-4 x weekly - 1 sets - 5 reps - 30 hold  - Supine Hamstring Stretch  - 1 x daily - 3-4 x weekly - 1 sets - 5 reps - 30 hold  - Long Sitting Hamstring Set  - 1 x daily - 3-4 x weekly - 2 sets - 10 reps - 5-10 hold  - Supine Bridge  - 1 x daily - 3-4 x weekly - 2 sets - 10 reps - 5-10 hold            Current Participants as of 6/17/2024    Name Type Comments Contact Info    Solo Ortiz MD Referring Provider  [phone]    Signature pending    Sloan Goins, PT Physical Therapist  [phone]    Signature pending

## 2024-06-17 NOTE — LETTER
June 17, 2024    Sloan Goins, PT  [address] [place], [facility] [address]  [facility] [address]    Patient: Jeana Graham   YOB: 1970   Date of Visit: 6/17/2024       Dear Solo Ortiz MD  [id number] [facility]  Department [place] [address]    The attached plan of care is being sent to you because your patient’s medical reimbursement requires that you certify the plan of care. Your signature is required to allow uninterrupted insurance coverage.      You may indicate your approval by signing below and faxing this form back to us at Dept Fax: [phone].    Please call Dept: [phone] with any questions or concerns.    Thank you for this referral,        Sloan Goins, PT  [place] [address] [facility]  [address] [facility] [address]    Payer: Payor: CIGNA / Plan: CIGNA HEALTH PLAN / Product Type: *No Product type* /                                                                         Date:     Dear Sloan Goins, PT,     Re: Ms. Jeana Graham, MRN:[id number]    I certify that I have reviewed the attached plan of care and it is medically necessary for Ms. Jeana Graham (1970) who is under my care.          ______________________________________                    _________________  Provider name and credentials                                           Date and time                                                                                           Plan of Care 6/17/24   Effective from: 6/17/2024  Effective to: 9/15/2024    Plan ID: [id number]            Participants as of Finalize on 6/17/2024    Name Type Comments Contact Info    Solo Ortiz MD Referring Provider  [phone]    Sloan Goins, PT Physical Therapist  [phone]       Last Plan Note     Author: Sloan Goins PT Status: Incomplete Last edited: 6/17/2024  3:15 PM       PT Initial Evaluation    Patient Name:   Jeana Graham    MRN:  [id number]    :  1970    Today's Date:  24    Time Calculation  Start Time: 1515  Stop Time: 1558  Time Calculation (min): 43 min  PT Evaluation Time Entry  PT Evaluation (Low) Time Entry: 15  PT Therapeutic Procedures Time Entry  Manual Therapy Time Entry: 8  Self-Care/Home Mgmt Training: 15     Informed Consent  Patient has been informed of all evaluation findings and treatment plans and agrees to participate in Physical Therapy services and plans as outlined.    Diagnosis:  Diagnosis and Precautions: S76.311D R hamstring muscle strain    Goals:   By the end of 6 visits patient will be able to do the following with < 0/10 R KNEE pain:    HEP:  Patient will consistently perform HER home exercise program for 20-30 minute sessions, 1-2x/day, 3-4 days/week independently by the end of 6 visits.    Basic ADL's:   Patient will perform bADL's/instrumental ADL's for 30 minutes moving between various closed kinetic chain postures.    ROM and Strength:  Patient will demonstrate 5/5 R KNEE strength in all planes and WNL's R KNEE AROM in all planes to improve their ability to lift, stand, ambulate and perform basic ADL's.    Stair Negotiation:  Patient will be able to ambulate up/down stairs for 1-2 flights at a time.    Gait/Locomotion:  Patient will be able to ambulate for 30-60 minutes at a time.  Minimal to no gait deviation across level ground/stairs.  Patient will demonstrate no R KNEE pain with the following movements:  partial squat, lunge, forward/lateral step-up, HR, stepdown and SLS.    WORK:  Patient will return to WORK and perform normal WORK activities pain free.    Sleep:  Patient will sleep thru the night 4/7 nights/week.    Participation restrictions:  Increase LEFS to > or = to 70/80 for increased functional ability.    Pain:  Decrease pain at worst to < or = to 0/10 for improved QOL and ability to sleep.    No point tenderness noted over the R posterolateral knee/calf.      Plan of Care:      Treatment/Interventions: Cryotherapy, Dry needling, Education/ Instruction, Electrical stimulation, Gait training, Manual therapy, Neuromuscular re-education, Self care/ home management, Taping techniques, Therapeutic activities, Therapeutic exercises, Ultrasound, Vasopneumatic device  PT Plan: Skilled PT  PT Frequency: 1 time per week  Duration: 6 visits  Onset Date: 06/07/24  Certification Period Start Date: 06/17/24  Certification Period End Date: 09/15/24  Number of Treatments Authorized: 6  Rehab Potential: Good  Plan of Care Agreement: Patient    PT Assessment:    Patient is a 53 y.o. FEMALE with c/o R posterolateral knee/calf pain.   Patient is alert and oriented x 3.  Patient presents with medical diagnosis of S76.311D R hamstring muscle strain  contributing to compensatory soft tissue dysfunction, pain, stiffness and weakness of the R knee.   Significant past medical history/past surgical history includes see above.    Skilled care is needed to progress the patient back to these activities without exacerbating symptoms.   Patient requires skilled PT services to address the problems identified and the individualized patient's goals as outlined in the problems and goals section of this evaluation.  A skilled PT is required to address these key impairments and to provide and progress with an appropriate home exercise program. Patient does have any significant PMH influencing Rx and reports motivation to return to FUNCTIONAL ACTIVITY.   Patient demonstrates to be a good candidate for physical therapy with good rehab potential and verbalized a good understanding of HER diagnosis, prognosis and treatment.  Goals have been established and reviewed with the patient.      PT Assessment Results: Decreased strength, Decreased range of motion, Decreased endurance, Impaired balance, Decreased mobility, Pain  Rehab Prognosis: Good  Evaluation/Treatment Tolerance: Patient tolerated treatment  well    Diagnosis:  S76.311D R hamstring muscle strain     Complexity:  Low complexity evaluation  due to a 15 minute duration, a past medical history WITH any personal factors and/or comorbidities that could impact the POC, examination of body systems completed on one to two elements, the patient presents with a stable condition, and clinical decision making using the LEFS was of low complexity.     Prognosis:  Rehab Prognosis: Good    Problem List  Activity Limitations, Decreased Functional Level, Decreased knowledge of HEP, Flexibility, Gait issues, Pain, Range of Motion/joint mobility issues, Strength, and Endurance    Impairments   IMPAIRED ROM LOWER BODY, IMPAIRED STRENGTH LOWER BODY, IMPAIRED GAIT, IMPAIRED CORE STABILITY, INCREASED PAIN, IMPAIRED FUNCTIONAL ACTIVITY LEVEL, and IMPAIRED FUNCTIONAL MOVEMENT PATTERNS    Functional Limitations:  LIMITATIONS PERFORMING BASIC ADL'S, PARTICIPATION IN HOBBIES, PARTICIPATION IN LEISURE ACTIVITIES, and PARTICIPATION IN HOME MANAGEMENT    General Visit Information:  Reason for Referral: PT Evaluate and Treat  Referred By: Dr. Solo Ortiz  General Comment: S76.311D R hamstring muscle strain    Pre-Cautions:  STEADI Fall Risk Score (The score of 4 or more indicates an increased risk of falling): 0     Medical Precautions:  (headaches, migraines, anxiety, C-sections, hernia repair 2005))     Reason for Visit:  PT Evaluate and Treat    Initial Evaluation:  Referred By: Dr. Solo Ortiz    Insurance  Insurance reviewed  Name of Insurance:  LIQVID  Visit No.  1  * (EVAL) PARTIAL TEAR OF RIGHT HAMSTRING S76.311A; 7000/14,000 DED (5,578.19 REMAINING) / 7000/14,000 OOP / 0% COINSUR / UNLIM V BMN / PA IS NOT REQ PER [email or web address] [id number]     Subjective:    Current Episode  Date of Onset:  6/7/2024  Chief Complaint:  Weakness, mild pain of the R knee.  Mechanism of injury:  Patient reports that on 6/7/24 she was cleaning the tub when she stood up and felt a pop in  the back of her R knee.  Patient does report doing a lot of hiking the previous few days.  Progression of symptoms:  Improved    Pain Score:  Pain Assessment: 0-10  Pain Assessment  Pain Assessment: 0-10  Pain Score: 0 - No pain (1/10 worst)  Pain Type: Acute pain  Pain Location: Knee  Pain Orientation: Right, Posterior  Pain Descriptors: Aching  Pain Frequency: Intermittent  Pain Type: Acute pain  Pain Location: Knee  Pain Orientation: Right, Posterior  Pain Descriptors: Aching  Pain Frequency: Intermittent    Better with:  hot tub    Worse with:  stairs, squatting, walking, getting in/out of car, kneeling    Medical History/Surgical History:  Medical Precautions:  (headaches, migraines, anxiety, C-sections, hernia repair 2005)    Reviewed medical history form with patient (medications/allergies reviewed with patient).  Current Outpatient Medications   Medication Instructions   • Ajovy Autoinjector 225 mg, subcutaneous, Every 30 days   • apple cider vinegar 600 mg capsule 1 capsule, oral, Daily   • ASPIRIN-ACETAMINOPHEN-CAFFEINE ORAL 2 tablets, oral, Daily   • bacillus coagulans-inulin 1 billion-250 cell-mg capsule 1 capsule, oral, Daily   • biotin 300 mcg tablet No dose, route, or frequency recorded.   • candesartan (ATACAND) 16 mg, oral, Daily   • cholecalciferol (Vitamin D-3) 5,000 Units tablet 1 tablet, oral, Daily   • coenzyme Q-10 100 mg capsule 1 capsule, oral, Daily   • fluticasone (Flonase) 50 mcg/actuation nasal spray 1 spray, Each Nostril, Daily, Shake gently. Before first use, prime pump. After use, clean tip and replace cap.   • garlic (garlic oiL) 1,000 mg capsule 1 capsule, oral, Daily   • magnesium oxide (MAG-CAPS ORAL) 1 capsule, oral, Daily   • meloxicam (MOBIC) 15 mg, oral, Daily   • mv-calcium-min-iron fm-FA-vitK (Multi For Her) 18 mg iron-600 mcg-80 mcg tablet 1 tablet, oral, Daily   • SUMAtriptan (IMITREX) 100 mg, oral, As needed, take 1 tablet at onset of migraine. May repeat in 2 hours if  needed   • TURMERIC ORAL 1 capsule, oral, Daily   • vit B1 mn/B2/B3/B5/B6/B12/C/FA (B COMPLEX W-VIT C ORAL) 1 tablet, oral, Daily, As directed   • vitamin E acetate (VITAMIN E ORAL) No dose, route, or frequency recorded.     Radiology:  Exam Information    Status Exam Begun Exam Ended   Final 6/12/2024 15:59 6/12/2024 16:05     Study Result    Narrative & Impression   Interpreted By:  Edilson Cole,   STUDY:  XR KNEE RIGHT 4+ VIEWS; ;  6/12/2024 4:05 pm      INDICATION:  Signs/Symptoms:pain.      COMPARISON:  None.      ACCESSION NUMBER(S):  [id number]      ORDERING CLINICIAN:  SHIMA BELTRÁN      FINDINGS:  Right knee, four views      There is no fracture. No effusion seen. There is no dislocation.  There are no degenerative changes. There is no lytic or sclerotic  lesion. There is no soft tissue abnormality seen.      IMPRESSION:  Normal radiographs of the right knee     Functional Assessment:  Level of Sumner:  Level of Sumner: Independent with ADLs and functional transfers    Social History:    Occupation:  Practice Manager  Work Status:  EMPLOYED  Patient Awareness:  Patient is aware of HER diagnosis and prognosis.  Social Support/History:  LIVES WITH FAMILY    Objective:    Weightbearing Status:  WBAT    Skin:  skin intact over R posterior knee/calf    Palpation:   point tenderness over R lateral hamstring, R lateral gastrocnemius    Sensation:  Patient denies numbness/tingling of bilateral LOWER extremities.    Gait:  Mild antalgic gait R LE    ROM:  AROM  R knee flexion/extension AROM -5-125 degrees  L knee AROM WNL's, R hip AROM WNL's, L hip AROM WNL's, R ankle/foot AROM WNL's, and L ankle/foot AROM WNL's    Strength:    R knee 4-/5 each  L knee 5/5 all planes, R hip 5/5 all planes, L hip 5/5 all planes, R ankle/foot 5/5 all planes, and L ankle/foot 5/5 all planes    Special Tests:  R knee- negative anterior drawer, negative posterior drawer, negative valgus stress, negative varus stress,  increased R knee pain with overpressure R knee flexion    Outcome measure  Lower Extremity Funtional Score (LEFS): 55/80     Treatment  Time in clinic started at  3:15pm  Time in clinic ended at  3:58pm  Total time in clinic is . 43 minutes  Total timed code time is  38 minutes    Treatment Performed Today:.   PT Initial Evaluation, Manual Therapy, and Self-Care/Home Management HEP  Individual(s) Educated: Patient  Education Provided: Home Exercise Program  Diagnosis and Precautions: S76.311D R hamstring muscle strain  Risk and Benefits Discussed with Patient/Caregiver/Other: yes  Patient/Caregiver Demonstrated Understanding: yes  Plan of Care Discussed and Agreed Upon: yes  Patient Response to Education: Patient/Caregiver Verbalized Understanding of Information, Patient/Caregiver Performed Return Demonstration of Exercises/Activities    Soft tissue work R lateral hamstring, R lateral calf    Patient educated as to benefits of soft tissue mobilization/instrument assisted soft tissue mobilization which includes decreased pain, improved soft tissue mobility/ROM/strength, reduced scar tissue, increased vascular response, allows for faster rehab time/recovery, patients can continue to engage in functional activities, and resolves chronic conditions thought to be permanent.  Patient educated as to they may experience redness or even possible bruising after completion of soft tissue work/instrument assisted soft tissue mobilization.  Patient encouraged to ice 2-3x/day for the next 2-3 days upon completion of treatment secondary to possible muscle/joint soreness.     Patient instructed in a home exercise program, has been given handouts for each of the exercises performed and was given another sheet instructing patient in the amount of reps to perform and the adeline to follow while doing the exercises     Access Code: [email or web address]  URL: [email or web address]  Date: 06/17/2024  Prepared by: Sloan  Buster    Exercises  - Long Sitting Calf Stretch with Strap  - 1 x daily - 3-4 x weekly - 1 sets - 5 reps - 30 hold  - Long Sitting Soleus Stretch on Bolster with Strap  - 1 x daily - 3-4 x weekly - 1 sets - 5 reps - 30 hold  - Supine Hamstring Stretch  - 1 x daily - 3-4 x weekly - 1 sets - 5 reps - 30 hold  - Long Sitting Hamstring Set  - 1 x daily - 3-4 x weekly - 2 sets - 10 reps - 5-10 hold  - Supine Bridge  - 1 x daily - 3-4 x weekly - 2 sets - 10 reps - 5-10 hold            Current Participants as of 6/17/2024    Name Type Comments Contact Info    Solo Ortiz MD Referring Provider  [phone]    Signature pending    Sloan Goins, PT Physical Therapist  [phone]    Signature pending

## 2024-06-26 ENCOUNTER — APPOINTMENT (OUTPATIENT)
Dept: PHYSICAL THERAPY | Facility: CLINIC | Age: 54
End: 2024-06-26
Payer: COMMERCIAL

## 2024-07-02 ENCOUNTER — APPOINTMENT (OUTPATIENT)
Dept: PHYSICAL THERAPY | Facility: CLINIC | Age: 54
End: 2024-07-02
Payer: COMMERCIAL

## 2024-07-08 ENCOUNTER — APPOINTMENT (OUTPATIENT)
Dept: PHYSICAL THERAPY | Facility: CLINIC | Age: 54
End: 2024-07-08
Payer: COMMERCIAL

## 2024-07-24 ENCOUNTER — APPOINTMENT (OUTPATIENT)
Dept: ORTHOPEDIC SURGERY | Facility: CLINIC | Age: 54
End: 2024-07-24
Payer: COMMERCIAL

## 2024-08-12 DIAGNOSIS — Z00.00 ENCOUNTER FOR GENERAL ADULT MEDICAL EXAMINATION WITHOUT ABNORMAL FINDINGS: ICD-10-CM

## 2024-08-12 RX ORDER — SUMATRIPTAN SUCCINATE 100 MG/1
100 TABLET ORAL AS NEEDED
Qty: 27 TABLET | Refills: 0 | Status: SHIPPED | OUTPATIENT
Start: 2024-08-12

## 2024-08-15 ENCOUNTER — TELEPHONE (OUTPATIENT)
Dept: NEUROLOGY | Facility: CLINIC | Age: 54
End: 2024-08-15
Payer: COMMERCIAL

## 2024-08-15 NOTE — TELEPHONE ENCOUNTER
"Patients last follow up visit was 10/25/23. I called and left a message 8/13/24 to call and schedule an appointment. I also sent a [employer] message on 8/14/24. To which she responded \"understood\"   "

## 2024-10-02 PROBLEM — R93.5 ABNORMAL ULTRASOUND OF ENDOMETRIUM: Status: RESOLVED | Noted: 2023-02-23 | Resolved: 2024-10-02

## 2024-10-02 PROBLEM — R87.810 HIGH-RISK HUMAN PAPILLOMAVIRUS (HPV) DNA DETECTED IN CERVICAL SPECIMEN: Status: RESOLVED | Noted: 2024-05-07 | Resolved: 2024-10-02

## 2024-10-02 PROBLEM — G43.711 CHRONIC MIGRAINE WITHOUT AURA, WITH INTRACTABLE MIGRAINE, SO STATED, WITH STATUS MIGRAINOSUS: Status: RESOLVED | Noted: 2023-02-23 | Resolved: 2024-10-02

## 2024-10-02 PROBLEM — R92.30 DENSE BREAST TISSUE ON MAMMOGRAM: Status: RESOLVED | Noted: 2023-02-23 | Resolved: 2024-10-02

## 2024-10-03 ENCOUNTER — TELEPHONE (OUTPATIENT)
Dept: PRIMARY CARE | Facility: CLINIC | Age: 54
End: 2024-10-03

## 2024-10-03 ENCOUNTER — TELEPHONE (OUTPATIENT)
Dept: OBSTETRICS AND GYNECOLOGY | Facility: CLINIC | Age: 54
End: 2024-10-03

## 2024-10-03 ENCOUNTER — LAB (OUTPATIENT)
Dept: LAB | Facility: LAB | Age: 54
End: 2024-10-03
Payer: COMMERCIAL

## 2024-10-03 ENCOUNTER — OFFICE VISIT (OUTPATIENT)
Dept: OBSTETRICS AND GYNECOLOGY | Facility: CLINIC | Age: 54
End: 2024-10-03
Payer: COMMERCIAL

## 2024-10-03 VITALS
BODY MASS INDEX: 23.43 KG/M2 | SYSTOLIC BLOOD PRESSURE: 114 MMHG | DIASTOLIC BLOOD PRESSURE: 66 MMHG | HEIGHT: 66 IN | WEIGHT: 145.8 LBS

## 2024-10-03 DIAGNOSIS — Z01.419 NORMAL GYNECOLOGIC EXAMINATION: Primary | ICD-10-CM

## 2024-10-03 DIAGNOSIS — N93.9 ABNORMAL UTERINE BLEEDING (AUB): ICD-10-CM

## 2024-10-03 DIAGNOSIS — D50.9 IRON DEFICIENCY ANEMIA, UNSPECIFIED IRON DEFICIENCY ANEMIA TYPE: ICD-10-CM

## 2024-10-03 DIAGNOSIS — Z00.00 WELL ADULT HEALTH CHECK: ICD-10-CM

## 2024-10-03 DIAGNOSIS — N80.03 ADENOMYOSIS: ICD-10-CM

## 2024-10-03 DIAGNOSIS — R92.333 HETEROGENEOUSLY DENSE TISSUE OF BOTH BREASTS ON MAMMOGRAPHY: ICD-10-CM

## 2024-10-03 DIAGNOSIS — Z12.31 BREAST CANCER SCREENING BY MAMMOGRAM: ICD-10-CM

## 2024-10-03 DIAGNOSIS — Z00.00 WELL ADULT EXAM: ICD-10-CM

## 2024-10-03 PROBLEM — N95.2 ATROPHIC VAGINITIS: Status: RESOLVED | Noted: 2023-02-23 | Resolved: 2024-10-03

## 2024-10-03 LAB
ALBUMIN SERPL BCP-MCNC: 4.1 G/DL (ref 3.4–5)
ALP SERPL-CCNC: 68 U/L (ref 33–110)
ALT SERPL W P-5'-P-CCNC: 14 U/L (ref 7–45)
ANION GAP SERPL CALC-SCNC: 12 MMOL/L (ref 10–20)
AST SERPL W P-5'-P-CCNC: 16 U/L (ref 9–39)
BILIRUB SERPL-MCNC: 0.4 MG/DL (ref 0–1.2)
BUN SERPL-MCNC: 17 MG/DL (ref 6–23)
CALCIUM SERPL-MCNC: 9.5 MG/DL (ref 8.6–10.3)
CHLORIDE SERPL-SCNC: 102 MMOL/L (ref 98–107)
CO2 SERPL-SCNC: 29 MMOL/L (ref 21–32)
CREAT SERPL-MCNC: 0.94 MG/DL (ref 0.5–1.05)
EGFRCR SERPLBLD CKD-EPI 2021: 72 ML/MIN/1.73M*2
ERYTHROCYTE [DISTWIDTH] IN BLOOD BY AUTOMATED COUNT: 13 % (ref 11.5–14.5)
GLUCOSE SERPL-MCNC: 97 MG/DL (ref 74–99)
HCT VFR BLD AUTO: 26.1 % (ref 36–46)
HGB BLD-MCNC: 8.5 G/DL (ref 12–16)
HIV 1+2 AB+HIV1 P24 AG SERPL QL IA: NONREACTIVE
MCH RBC QN AUTO: 31.4 PG (ref 26–34)
MCHC RBC AUTO-ENTMCNC: 32.6 G/DL (ref 32–36)
MCV RBC AUTO: 96 FL (ref 80–100)
NRBC BLD-RTO: 0 /100 WBCS (ref 0–0)
PLATELET # BLD AUTO: 355 X10*3/UL (ref 150–450)
POTASSIUM SERPL-SCNC: 4.1 MMOL/L (ref 3.5–5.3)
PROT SERPL-MCNC: 6.4 G/DL (ref 6.4–8.2)
RBC # BLD AUTO: 2.71 X10*6/UL (ref 4–5.2)
SODIUM SERPL-SCNC: 139 MMOL/L (ref 136–145)
TSH SERPL-ACNC: 2 MIU/L (ref 0.44–3.98)
WBC # BLD AUTO: 5.5 X10*3/UL (ref 4.4–11.3)

## 2024-10-03 PROCEDURE — 99396 PREV VISIT EST AGE 40-64: CPT | Performed by: OBSTETRICS & GYNECOLOGY

## 2024-10-03 PROCEDURE — 86803 HEPATITIS C AB TEST: CPT

## 2024-10-03 PROCEDURE — 85027 COMPLETE CBC AUTOMATED: CPT

## 2024-10-03 PROCEDURE — 80053 COMPREHEN METABOLIC PANEL: CPT

## 2024-10-03 PROCEDURE — 84443 ASSAY THYROID STIM HORMONE: CPT

## 2024-10-03 PROCEDURE — 36415 COLL VENOUS BLD VENIPUNCTURE: CPT

## 2024-10-03 PROCEDURE — 87389 HIV-1 AG W/HIV-1&-2 AB AG IA: CPT

## 2024-10-03 PROCEDURE — 1036F TOBACCO NON-USER: CPT | Performed by: OBSTETRICS & GYNECOLOGY

## 2024-10-03 PROCEDURE — 3008F BODY MASS INDEX DOCD: CPT | Performed by: OBSTETRICS & GYNECOLOGY

## 2024-10-03 RX ORDER — MEDROXYPROGESTERONE ACETATE 10 MG/1
10 TABLET ORAL 2 TIMES DAILY
Qty: 60 TABLET | Refills: 1 | Status: SHIPPED | OUTPATIENT
Start: 2024-10-03 | End: 2024-12-02

## 2024-10-03 NOTE — PROGRESS NOTES
GYNECOLOGY PROGRESS NOTE        CC:    Chief Complaint   Patient presents with    Annual Exam     Est patient- annual exam - patient also mentioned heavy bleeding for about 11-12 days states feels very tired and weak due to the bleeding.  Pap2021 neg w/HPV neg  Mamm 2024 het dense  Colon   Chaperone mary shaw ma            HPI:  Jeana Graham is here for a routine GYN examination.      She has GYN complaints of AUB.  Patient is perimenopausal and is not having regular cycles.  Last October she was out of town and stood up and had a large gush of blood.  The very heavy bleeding lasted for a couple of days, she did not contact the office about this.  Then in January she had an episode of heavy prolonged bleeding with clots that lasted for 27 days, which she did not contact the office about this either.  She also had COVID in the middle of that.  After that she only had a couple days of spotting here and there but no normal cycle or heavy bleeding.  She had an episode of bleeding again this started recently that was 11 to 12 days of very heavy bleeding with clot passage and gushing of blood, she is tapered off now and is still bleeding but it is not as heavy.  She denies any blood in her urine or stool.  She denies any history of HGSIL, last Pap and HPV testing normal in .  She is not on any blood thinners or hormonal Rx.  Her abdominal surgeries include  x 3, tubal ligation, and a   hernia repair with mesh.  She had a hysteroscopy with EMB 3/2022 in the office with me, she states that she does not numb well at the dentist and did not numb well with that and found the procedure extremely painful and would not want to repeat it in the office setting.  She feels very fatigued and is sure that she is anemic.      ROS:  GEN - + fatigue   GI - no blood in BMs  URO - no hematuria  GYN -see HPI  PSYCH - mood OK        PHYSICAL EXAM:  /66 (BP Location: Left arm, Patient Position: Sitting)   " Ht 1.683 m (5' 6.25\")   Wt 66.1 kg (145 lb 12.8 oz)   BMI 23.36 kg/m²   GEN:  A&O, NAD  ABD:  NT/ND, soft, no palpable masses, + puckered Pfannensteil  scar  URO:  normal urethra, no bladder TTP  GYN:  normal vulva and perineum w/o lesions or ulcers, normal vagina without discharge or lesions--small blood in vaginal vault, normal cervix without lesions or discharge or CMT--small blood trickle noted from os, uterus difficult to palpate due to abdominal wall scarring but a palpates at approximately 12 weeks size and 6 without TTP, adnexa not palpable either but no TTP or masses in the area of the adnexa   BREAST:  no masses or TTP, no skin lesions or nipple discharge  DERM:  no hirsutism or acne   PSYCH:  normal affect, non-anxious      LAB RESULTS:  21 - pap/HPV wnl    Lab Results   Component Value Date    TSH 1.11 2023     Lab Results   Component Value Date    WBC 7.4 2023    HGB 13.6 2023    HCT 41.2 2023    MCV 98 2023     2023         IMAGING RESULTS:    02/15/22 - US PELVIS  - Impression -  1. Endometrial canal thickness of 1.7 cm, not unusual in a  menstruating female.  2. Nonvisualization right ovary.  3. Unremarkable left ovary.     **I personally reviewed the pelvic ultrasound images and my impressions are 1.7 cm erogenous endometrium, heterogenous myometrium consistent with adenomyosis, right ovary not visualized but no abnormalities noted in the region of the right adnexa, left ovary normal, no fibroids, no free fluid.         IMPRESSION/PLAN:  Problem List Items Addressed This Visit       Abnormal uterine bleeding (AUB)    Relevant Orders    US PELVIS TRANSABDOMINAL WITH TRANSVAGINAL    Adenomyosis     Other Visit Diagnoses       Normal gynecologic examination    -  Primary    Breast cancer screening by mammogram        Relevant Orders    BI mammo bilateral screening tomosynthesis    Iron deficiency anemia, unspecified iron deficiency anemia " type        Relevant Orders    CBC    Heterogeneously dense tissue of both breasts on mammography               Pap and HPV normal in 2021, no Hx of HGSIL, next due in 2026.     Mammogram up to date and normal.  Breast density on last mammogram is heterogeneous.  Due to decreased sensitivity of mammogram screening with dense breasts a yearly MARCY is recommended for screening.   Alternate imaging discussed--and not elected at this time     Colon CA screening: Normal colonoscopy in 2022, next due in 10 years (2032).    AUB:  Recurrent.  NOT perimenopausal bleeding pattern--discussed with the patient.  Discussed with the patient the potential etiologies  (PALMCOEIN) for her AUB.  Suspected diagnosis is endometrial polyps versus adenomyosis.  Prior office hysteroscopy with EMB 3/2022 normal--tolerated poorly due to poor numbing, declines future office procedures.  CBC and repeat pelvic US ordered.  Offered treatment with continuous progesterones in the interim, patient agreeable (Rx for Provera 10mg provided, use/AE reviewed, home tapering regimen of the dose to 10 mg up to 40 mg PRN to help control bleeding discussed).         Office will call with lab and US results.        Brayan Floyd, DO

## 2024-10-03 NOTE — TELEPHONE ENCOUNTER
Spoke with patient regarding results and recommendation.  Reviewed and approved by MARK RODRIGUEZ on 10/3/24 at 2:40 PM.

## 2024-10-03 NOTE — TELEPHONE ENCOUNTER
----- Message from Brayan Mariel sent at 10/3/2024 12:39 PM EDT -----  Call patient let her know that her hemoglobin is 8.5. I recommend that she start an iron supplement like iron sulfate twice a day over-the-counter. Add in stool softener/Colace as needed to prevent constipation. I don't think she's low enough that she needs IV iron.

## 2024-10-04 LAB — HCV AB SER QL: NONREACTIVE

## 2024-10-08 ENCOUNTER — APPOINTMENT (OUTPATIENT)
Dept: OBSTETRICS AND GYNECOLOGY | Facility: CLINIC | Age: 54
End: 2024-10-08
Payer: COMMERCIAL

## 2024-10-08 DIAGNOSIS — N93.9 ABNORMAL UTERINE BLEEDING (AUB): Primary | ICD-10-CM

## 2024-10-08 RX ORDER — DIPHENHYDRAMINE HYDROCHLORIDE 50 MG/ML
50 INJECTION INTRAMUSCULAR; INTRAVENOUS AS NEEDED
OUTPATIENT
Start: 2024-10-08

## 2024-10-08 RX ORDER — FAMOTIDINE 10 MG/ML
20 INJECTION INTRAVENOUS ONCE AS NEEDED
OUTPATIENT
Start: 2024-10-08

## 2024-10-08 RX ORDER — EPINEPHRINE 0.3 MG/.3ML
0.3 INJECTION SUBCUTANEOUS EVERY 5 MIN PRN
OUTPATIENT
Start: 2024-10-08

## 2024-10-08 RX ORDER — ALBUTEROL SULFATE 0.83 MG/ML
3 SOLUTION RESPIRATORY (INHALATION) AS NEEDED
OUTPATIENT
Start: 2024-10-08

## 2024-10-11 ENCOUNTER — HOSPITAL ENCOUNTER (OUTPATIENT)
Dept: RADIOLOGY | Facility: HOSPITAL | Age: 54
Discharge: HOME | End: 2024-10-11
Payer: COMMERCIAL

## 2024-10-11 DIAGNOSIS — N93.9 ABNORMAL UTERINE BLEEDING (AUB): ICD-10-CM

## 2024-10-11 PROCEDURE — 76856 US EXAM PELVIC COMPLETE: CPT

## 2024-10-15 ENCOUNTER — TELEPHONE (OUTPATIENT)
Dept: OBSTETRICS AND GYNECOLOGY | Facility: CLINIC | Age: 54
End: 2024-10-15
Payer: COMMERCIAL

## 2024-10-15 NOTE — TELEPHONE ENCOUNTER
----- Message from Brayan Floyd sent at 10/14/2024 10:22 AM EDT -----  Call patient let her know that I reviewed her ultrasound results.  It was read out as normal by the radiologist, however I thoroughly look at the images not just the report.  The endometrium is fairly thick for her age.  In looking at the images it looks like the endometrium is a little irregular (heterogeneous) which can be consistent with a polyp.  There is a follicle on the 1 ovary but the ovaries themselves are normal.  There is a small fibroid measuring 1.5 cm in the wall of the uterus, however this is not of a size or location that would be causing her bleeding.  Please schedule her for an office hysteroscopy for further evaluation, if there is a polyp present we will remove it at the same time.

## 2024-10-15 NOTE — TELEPHONE ENCOUNTER
Spoke with patient and made her aware of the results and recommendation.  Patient scheduled for the procedure with Nitrous on 11/12 and all involved parties are aware of the apt.  Patient also mentioned that she is still bleeding taking the Provera twice a day and I advised her that she can increase it to 2 tab twice a day.  She is going to try that and if she continues to bleed will call the office and let me know.  Patient also mentioned that she is able to tolerated the Iron supplement at this time and did not schedule the iron infusion due to coverage.  Reviewed and approved by MARK RODRIGUEZ on 10/15/24 at 9:36 AM.

## 2024-10-21 DIAGNOSIS — N93.9 ABNORMAL UTERINE BLEEDING (AUB): ICD-10-CM

## 2024-10-21 RX ORDER — MEGESTROL ACETATE 40 MG/1
40 TABLET ORAL DAILY
Qty: 60 TABLET | Refills: 0 | Status: SHIPPED | OUTPATIENT
Start: 2024-10-21 | End: 2024-12-20

## 2024-10-24 ENCOUNTER — APPOINTMENT (OUTPATIENT)
Dept: OBSTETRICS AND GYNECOLOGY | Facility: CLINIC | Age: 54
End: 2024-10-24
Payer: COMMERCIAL

## 2024-11-12 ENCOUNTER — APPOINTMENT (OUTPATIENT)
Dept: OBSTETRICS AND GYNECOLOGY | Facility: CLINIC | Age: 54
End: 2024-11-12
Payer: COMMERCIAL

## 2024-11-12 ENCOUNTER — PROCEDURE VISIT (OUTPATIENT)
Dept: OBSTETRICS AND GYNECOLOGY | Facility: CLINIC | Age: 54
End: 2024-11-12

## 2024-11-12 VITALS
DIASTOLIC BLOOD PRESSURE: 86 MMHG | BODY MASS INDEX: 23.53 KG/M2 | WEIGHT: 146.4 LBS | SYSTOLIC BLOOD PRESSURE: 138 MMHG | HEIGHT: 66 IN

## 2024-11-12 DIAGNOSIS — F18.90: Primary | ICD-10-CM

## 2024-11-12 DIAGNOSIS — N84.0 ENDOMETRIAL POLYP: ICD-10-CM

## 2024-11-12 DIAGNOSIS — R93.5 ABNORMAL ULTRASOUND OF ENDOMETRIUM: ICD-10-CM

## 2024-11-12 DIAGNOSIS — D50.9 IRON DEFICIENCY ANEMIA, UNSPECIFIED IRON DEFICIENCY ANEMIA TYPE: ICD-10-CM

## 2024-11-12 DIAGNOSIS — N93.9 ABNORMAL UTERINE BLEEDING (AUB): Primary | ICD-10-CM

## 2024-11-12 PROCEDURE — NITOX NITROUS OXIDE - SELF PAY: Performed by: OBSTETRICS & GYNECOLOGY

## 2024-11-12 PROCEDURE — 58558 HYSTEROSCOPY BIOPSY: CPT | Performed by: OBSTETRICS & GYNECOLOGY

## 2024-11-12 PROCEDURE — 88305 TISSUE EXAM BY PATHOLOGIST: CPT | Performed by: PATHOLOGY

## 2024-11-12 PROCEDURE — 88305 TISSUE EXAM BY PATHOLOGIST: CPT

## 2024-11-12 RX ORDER — BUPIVACAINE HYDROCHLORIDE 2.5 MG/ML
10 INJECTION, SOLUTION INFILTRATION; PERINEURAL ONCE
Status: COMPLETED | OUTPATIENT
Start: 2024-11-12 | End: 2024-11-12

## 2024-11-12 NOTE — PROGRESS NOTES
Nitrous Oxide Pre-Procedure Checklist reviewed, Analgesia Patient Agreement reviewed/signed, Notice of Non-Covered Benefit reviewed/signed, and Vital Sign Documentation completed and all documents were scanned to the chart.   Rocio John CNM

## 2024-11-12 NOTE — PROGRESS NOTES
"GYNECOLOGY PROGRESS NOTE WITH PROCEDURE        CC:     Chief Complaint   Patient presents with    Procedure     Est patient - H/S EMB with Nitrous  Chaperone mary shaw ma, Rocio John CNM        HPI:  Jeana Graham is here for office hysteroscopy with EMB for AUB and thickened endometrium on US--currently not bleeding.  Taking Megace and oral FE (tolerating it better now).  No new GYN complaints.  Prior H/S EMB 3/2022 reported as extremely painful and thus patient requested and booked self-pay nitrous oxide today for pain relief with scheduled procedure.      ROS:  GYN - see HPI      HISTORY:  Past Surgical History:   Procedure Laterality Date     SECTION, LOW TRANSVERSE      x 3    HERNIA REPAIR      at  [facility], with mesh    HYSTEROSCOPY      office with EMB    HYSTEROSCOPY W/ POLYPECTOMY  2022    TUBAL LIGATION           PHYSICAL EXAM:  /86 (BP Location: Left arm, Patient Position: Sitting)   Ht 1.683 m (5' 6.25\")   Wt 66.4 kg (146 lb 6.4 oz)   BMI 23.45 kg/m²   GEN:  A&O, NAD  URO:  normal urethral meatus  GYN:  normal vulva and perineum w/o lesions or ulcers, normal vagina without discharge or lesions, normal cervix without lesions or discharge  PSYCH:  normal affect, non-anxious       LAB RESULTS:  Lab Results   Component Value Date    WBC 5.5 10/03/2024    HGB 8.5 (L) 10/03/2024    HCT 26.1 (L) 10/03/2024    MCV 96 10/03/2024     10/03/2024         IMAGING RESULTS:    10/11/24 - US PELVIS TRANSABDOMINAL WITH TRANSVAGINAL    - Impression -  The endometrium reaches 1.3 cm in diameter. Continued surveillance is  recommended.    1.5 x 1.4 x 2.3 cm hyperechoic fibroid in the anterior uterine body.    1.5 cm right ovarian cyst or follicle. There is no right ovarian  torsion.    Nonvisualization of the left ovary.    **I personally reviewed the pelvic ultrasound images and my impressions are heterogeneous thickened endometrium measuring 1.6 cm with rounded densities at " fundus consistent with polyps, 1.5 cm incidental intramural fibroid, normal RT ovary, LOV not visualized, heterogenous myometrium consistent with adenomyosis    ----------------------------------------------------------------------------    HYSTEROSCOPY WITH ENDOMETRIAL SAMPLING PROCEDURE NOTE  Patient's pre-procedure questions were answered and a written informed consent form was signed.   Nitrous oxide was then administered per office JIMBO John--see her documentation for details of this administration.   Patient was placed in lithotomy position on the procedure room table.  A speculum was placed in the vagina. The cervix was cleansed × 3 with Betadine.  A single-tooth tenaculum was placed on the anterior lip of the cervix.  A paracervical block of 10 mL of 0.25% marcaine was placed.   The [facility] hysteroscope was then inserted into the endometrial cavity, normal saline was used for the hysteroscopic fluid medium.  The cavity distended well with evidence of endometrial polyp(s).  The background endometrium appeared normal/thickened.  The resectoscope was placed through the hysteroscope and the polyp(s) was then removed, as was background endometrial tissue. Procedure was then ended, the instruments removed from the uterus and vagina. The tenaculum site was hemostatic. The patient tolerated the procedure poorly even with a cervical block and the nitrous oxide.  Blood loss was minimal.    ----------------------------------------------------------------------------    IMPRESSION/PLAN:    Problem List Items Addressed This Visit       Abnormal uterine bleeding (AUB) - Primary    Relevant Medications    BUPivacaine HCl (Marcaine) 0.25 % (2.5 mg/mL) injection 25 mg (Completed) (Start on 11/12/2024  1:45 PM)    Other Relevant Orders    Surgical Pathology Exam     Other Visit Diagnoses       Abnormal ultrasound of endometrium        Endometrial polyp        Iron deficiency anemia, unspecified iron deficiency  anemia type              AUB:   Potential etiologies reviewed, suspected diagnosis is endometrial polyps given acute onset of AUB symptoms.  US results show abnormal thickened cystic endometrium with probable polyps and probable adenomyosis changes.  Office hysteroscopy done today reveals endometrial polyps, hysteroscopy with endometrial sampling done today without complication, postprocedure instructions reviewed.   Will have patient continue Megace until follow-up visit due to thickened endometrium noted on H/S today.    Anemia:  HgB=8.5.  Was started on oral FE but had side effects, now tolerating.  Previously ordered IV FE not elected by patient given inability to get cost estimate (per infusion center RN).      Follow-up in 2 to 3 weeks for virtual visit to discuss path results and to recheck AUB symptoms.      Brayan Floyd, DO      Nitrous Oxide Pre-Procedure Checklist reviewed, Analgesia Patient Agreement reviewed/signed, Notice of Non-Covered Benefit reviewed/signed, and Vital Sign Documentation completed and all documents were scanned to the chart.   Rocio John CNM

## 2024-11-19 LAB
LABORATORY COMMENT REPORT: NORMAL
PATH REPORT.FINAL DX SPEC: NORMAL
PATH REPORT.GROSS SPEC: NORMAL
PATH REPORT.RELEVANT HX SPEC: NORMAL
PATH REPORT.TOTAL CANCER: NORMAL

## 2024-11-20 ENCOUNTER — TELEPHONE (OUTPATIENT)
Dept: OBSTETRICS AND GYNECOLOGY | Facility: CLINIC | Age: 54
End: 2024-11-20
Payer: COMMERCIAL

## 2024-11-26 ENCOUNTER — APPOINTMENT (OUTPATIENT)
Dept: OBSTETRICS AND GYNECOLOGY | Facility: CLINIC | Age: 54
End: 2024-11-26
Payer: COMMERCIAL

## 2024-11-26 DIAGNOSIS — N93.9 ABNORMAL UTERINE BLEEDING (AUB): ICD-10-CM

## 2024-11-26 DIAGNOSIS — D50.9 IRON DEFICIENCY ANEMIA, UNSPECIFIED IRON DEFICIENCY ANEMIA TYPE: ICD-10-CM

## 2024-11-26 DIAGNOSIS — N80.03 ADENOMYOSIS: Primary | ICD-10-CM

## 2024-11-26 PROBLEM — T88.59XA ANESTHESIA COMPLICATION: Status: RESOLVED | Noted: 2023-02-23 | Resolved: 2024-11-26

## 2024-11-26 PROCEDURE — 99213 OFFICE O/P EST LOW 20 MIN: CPT | Performed by: OBSTETRICS & GYNECOLOGY

## 2024-11-26 RX ORDER — MEGESTROL ACETATE 40 MG/1
40 TABLET ORAL 2 TIMES DAILY
Qty: 60 TABLET | Refills: 1 | Status: SHIPPED | OUTPATIENT
Start: 2024-11-26 | End: 2025-01-25

## 2024-11-26 NOTE — PROGRESS NOTES
GYNECOLOGY VIRTUAL VISIT PROGRESS NOTE          CC:     Chief Complaint   Patient presents with    Results     EMB results        HPI:  Jeana Graham is scheduled today for virtual visit to discuss EMB test results.   Audio and Visual communication real-time were utilized and verbal consent was obtained for the encounter.    No new GYN complaints.  She is still having bleeding and is annoyed that it is persisting, but the bleeding is much lighter than before.  She is still taking Megace 40 mg daily as prescribed.  Patient relates that the second hysteroscopy was also very uncomfortable and she states that she would never do an office-based hysteroscopy again.  Patient is premenopausal still.      ROS:  GYN - see HPI      PHYSICAL EXAM:  VS:  n/a (virtual visit)  GEN:  A&O, NAD  PSYCH:  normal affect, non-anxious      IMPRESSION/PLAN:    Problem List Items Addressed This Visit       Abnormal uterine bleeding (AUB)    Relevant Medications    megestrol (Megace) 40 mg tablet    Adenomyosis - Primary     Other Visit Diagnoses       Iron deficiency anemia, unspecified iron deficiency anemia type              AUB;   Most likely AUB-P as well as AUB-A (given probable adenomyosis on US, risk factor=age > 40 and prior  x 3).  AUB symptoms improving S/P hysteroscopic polypectomy.  Prior H/S polypectopmy in2 022 with benign pathology as well, followed by resolution of prior AUB symptoms.  Path results benign--reviewed with patient (polyp not identified by pathologist, but clinically were polyps at office H/S).  At this point time commend continued use of Megace 40 mg 80 mg daily for the next month very thickened endometrium on ultrasound and hysteroscopy.  After the month will have patient stop Rx to see how her bleeding does after the prolonged course of continuous progesterones.  Previously failed Rx Provera prior to the hysteroscopic polypectomy procedure.  Treatment options of progesterone IUD, endometrial  ablation (NOT in OFFICE), or definitive hysterectomy discussed--patient agrees with recommended plan to continue the Megace Rx for 1 more month and then to see how her periods do thereafter to see if additional treatment is needed.  AUB precautions reviewed.      Instructed patient to notify office once off Megace in January if any recurrent AUB and additional treatment it needed.      Brayan Floyd, DO

## 2024-12-03 ENCOUNTER — APPOINTMENT (OUTPATIENT)
Dept: OBSTETRICS AND GYNECOLOGY | Facility: CLINIC | Age: 54
End: 2024-12-03
Payer: COMMERCIAL

## 2024-12-05 ENCOUNTER — APPOINTMENT (OUTPATIENT)
Dept: OBSTETRICS AND GYNECOLOGY | Facility: CLINIC | Age: 54
End: 2024-12-05
Payer: COMMERCIAL

## 2025-02-04 DIAGNOSIS — Z00.00 ENCOUNTER FOR GENERAL ADULT MEDICAL EXAMINATION WITHOUT ABNORMAL FINDINGS: ICD-10-CM

## 2025-02-04 RX ORDER — SUMATRIPTAN SUCCINATE 100 MG/1
100 TABLET ORAL AS NEEDED
Qty: 27 TABLET | Refills: 1 | Status: SHIPPED | OUTPATIENT
Start: 2025-02-04

## 2025-02-11 ENCOUNTER — HOSPITAL ENCOUNTER (OUTPATIENT)
Dept: RADIOLOGY | Facility: HOSPITAL | Age: 55
Discharge: HOME | End: 2025-02-11
Payer: COMMERCIAL

## 2025-02-11 VITALS — BODY MASS INDEX: 23.56 KG/M2 | HEIGHT: 66 IN | WEIGHT: 146.6 LBS

## 2025-02-11 DIAGNOSIS — Z12.31 BREAST CANCER SCREENING BY MAMMOGRAM: ICD-10-CM

## 2025-02-11 PROCEDURE — 77067 SCR MAMMO BI INCL CAD: CPT

## 2025-02-11 PROCEDURE — 77067 SCR MAMMO BI INCL CAD: CPT | Performed by: RADIOLOGY

## 2025-02-11 PROCEDURE — 77063 BREAST TOMOSYNTHESIS BI: CPT | Performed by: RADIOLOGY

## 2025-04-24 ENCOUNTER — TELEPHONE (OUTPATIENT)
Dept: PRIMARY CARE | Facility: CLINIC | Age: 55
End: 2025-04-24
Payer: COMMERCIAL

## 2025-04-25 DIAGNOSIS — Z00.00 WELL ADULT HEALTH CHECK: Primary | ICD-10-CM

## 2025-05-02 ENCOUNTER — TELEPHONE (OUTPATIENT)
Dept: PRIMARY CARE | Facility: CLINIC | Age: 55
End: 2025-05-02
Payer: COMMERCIAL

## 2025-05-02 NOTE — TELEPHONE ENCOUNTER
Patient is requesting an Rx for an anti-viral for stomach concerns while [place] of the country.    She is going to [place] in [place]

## 2025-05-08 DIAGNOSIS — A09 TRAVELER'S DIARRHEA: Primary | ICD-10-CM

## 2025-05-08 RX ORDER — CIPROFLOXACIN 250 MG/1
250 TABLET, FILM COATED ORAL 2 TIMES DAILY
Qty: 14 TABLET | Refills: 0 | Status: SHIPPED | OUTPATIENT
Start: 2025-05-08 | End: 2025-05-15

## 2025-06-15 LAB
ALBUMIN SERPL-MCNC: 4.5 G/DL (ref 3.6–5.1)
ALP SERPL-CCNC: 106 U/L (ref 37–153)
ALT SERPL-CCNC: 16 U/L (ref 6–29)
ANION GAP SERPL CALCULATED.4IONS-SCNC: 8 MMOL/L (CALC) (ref 7–17)
AST SERPL-CCNC: 19 U/L (ref 10–35)
BILIRUB SERPL-MCNC: 0.6 MG/DL (ref 0.2–1.2)
BUN SERPL-MCNC: 21 MG/DL (ref 7–25)
CALCIUM SERPL-MCNC: 9.8 MG/DL (ref 8.6–10.4)
CHLORIDE SERPL-SCNC: 103 MMOL/L (ref 98–110)
CHOLEST SERPL-MCNC: 230 MG/DL
CHOLEST/HDLC SERPL: 4 (CALC)
CO2 SERPL-SCNC: 28 MMOL/L (ref 20–32)
CREAT SERPL-MCNC: 0.89 MG/DL (ref 0.5–1.03)
EGFRCR SERPLBLD CKD-EPI 2021: 77 ML/MIN/1.73M2
ERYTHROCYTE [DISTWIDTH] IN BLOOD BY AUTOMATED COUNT: 12.1 % (ref 11–15)
GLUCOSE SERPL-MCNC: 87 MG/DL (ref 65–99)
HCT VFR BLD AUTO: 42.8 % (ref 35–45)
HDLC SERPL-MCNC: 57 MG/DL
HGB BLD-MCNC: 14 G/DL (ref 11.7–15.5)
LDLC SERPL CALC-MCNC: 142 MG/DL (CALC)
MCH RBC QN AUTO: 30.8 PG (ref 27–33)
MCHC RBC AUTO-ENTMCNC: 32.7 G/DL (ref 32–36)
MCV RBC AUTO: 94.3 FL (ref 80–100)
NONHDLC SERPL-MCNC: 173 MG/DL (CALC)
PLATELET # BLD AUTO: 287 THOUSAND/UL (ref 140–400)
PMV BLD REES-ECKER: 11.1 FL (ref 7.5–12.5)
POTASSIUM SERPL-SCNC: 3.9 MMOL/L (ref 3.5–5.3)
PROT SERPL-MCNC: 7.2 G/DL (ref 6.1–8.1)
RBC # BLD AUTO: 4.54 MILLION/UL (ref 3.8–5.1)
SODIUM SERPL-SCNC: 139 MMOL/L (ref 135–146)
TRIGL SERPL-MCNC: 172 MG/DL
TSH SERPL-ACNC: 2.19 MIU/L
WBC # BLD AUTO: 5.9 THOUSAND/UL (ref 3.8–10.8)

## 2025-07-03 ENCOUNTER — APPOINTMENT (OUTPATIENT)
Dept: PRIMARY CARE | Facility: CLINIC | Age: 55
End: 2025-07-03
Payer: COMMERCIAL

## 2025-07-03 VITALS
HEART RATE: 76 BPM | BODY MASS INDEX: 23.72 KG/M2 | RESPIRATION RATE: 16 BRPM | DIASTOLIC BLOOD PRESSURE: 92 MMHG | HEIGHT: 66 IN | WEIGHT: 147.6 LBS | OXYGEN SATURATION: 97 % | SYSTOLIC BLOOD PRESSURE: 148 MMHG

## 2025-07-03 DIAGNOSIS — Z00.00 WELL ADULT EXAM: Primary | ICD-10-CM

## 2025-07-03 DIAGNOSIS — R03.0 ELEVATED BLOOD PRESSURE READING: ICD-10-CM

## 2025-07-03 PROBLEM — F41.9 ANXIETY SYNDROME: Status: RESOLVED | Noted: 2023-02-23 | Resolved: 2025-07-03

## 2025-07-03 PROBLEM — K21.9 GERD (GASTROESOPHAGEAL REFLUX DISEASE): Status: RESOLVED | Noted: 2023-02-23 | Resolved: 2025-07-03

## 2025-07-03 PROBLEM — I34.1 MITRAL VALVE PROLAPSE, NONRHEUMATIC: Status: RESOLVED | Noted: 2023-02-23 | Resolved: 2025-07-03

## 2025-07-03 PROCEDURE — 99396 PREV VISIT EST AGE 40-64: CPT | Performed by: INTERNAL MEDICINE

## 2025-07-03 PROCEDURE — 3008F BODY MASS INDEX DOCD: CPT | Performed by: INTERNAL MEDICINE

## 2025-07-03 ASSESSMENT — PAIN SCALES - GENERAL: PAINLEVEL_OUTOF10: 0-NO PAIN

## 2025-07-03 ASSESSMENT — ENCOUNTER SYMPTOMS
SHORTNESS OF BREATH: 0
ABDOMINAL PAIN: 0
SORE THROAT: 0
PALPITATIONS: 0
FEVER: 0
CHILLS: 0
TROUBLE SWALLOWING: 0

## 2025-07-03 ASSESSMENT — PATIENT HEALTH QUESTIONNAIRE - PHQ9
SUM OF ALL RESPONSES TO PHQ9 QUESTIONS 1 AND 2: 0
1. LITTLE INTEREST OR PLEASURE IN DOING THINGS: NOT AT ALL
2. FEELING DOWN, DEPRESSED OR HOPELESS: NOT AT ALL

## 2025-07-03 NOTE — PROGRESS NOTES
Subjective   Jeana Graham is a 55 y.o. female who presents for Annual Exam (Review labs ).      History of Present Illness  The patient presents for a well exam.    Blood Pressure  She reports a slight elevation in her blood pressure, which she attributes to anxiety due to an upcoming event. She does not monitor her blood pressure at home.  - Character: Slight elevation.  - Alleviating/Aggravating Factors: Anxiety due to an upcoming event.    Weight Fluctuations  She has been experiencing weight fluctuations, with a recent increase that she believes may be due to menopause or aging. Her weight typically ranges from 125 to 135 pounds, but she suspects a recent gain of 10 to 12 pounds. She has not made any changes to her diet or medication regimen. She maintains an active lifestyle, including regular walks.  - Onset: Recent increase.  - Character: Weight fluctuations.  - Alleviating/Aggravating Factors: Believes it may be due to menopause or aging.  - Severity: Suspects a recent gain of 10 to 12 pounds.    Back and Leg Tension  She recalls an incident last year where she experienced a popping sensation in her back after extensive hiking and walking, which required orthopedic consultation. She is currently experiencing similar symptoms on the other side and is being cautious. She also mentions feeling tension in the back of her leg, which she thinks might be due to prolonged sitting.  - Onset: Incident last year; currently experiencing similar symptoms.  - Location: Back; tension in the back of her leg.  - Character: Popping sensation; tension.  - Alleviating/Aggravating Factors: Extensive hiking and walking; prolonged sitting.    Discontinued Turmeric Use  She has discontinued the use of turmeric due to heart palpitations but continues to take apple cider vinegar and CoQ10.  - Character: Heart palpitations.  - Alleviating/Aggravating Factors: Discontinued turmeric use.    Menopausal Symptoms  She experienced severe  "menopausal symptoms from 09/2024 through 01/2025, with only 9 days of relief during this period. She was under the care of Dr. Floyd, who performed a hysteroscopy and planned a hysterectomy. However, her symptoms ceased within two days, leading to the cancellation of the hysterectomy. She was diagnosed with anemia in 10/2024 but has since returned to normal levels. Her GYN care is up to date. She was prescribed progestin, which did not alleviate her symptoms, but they subsided after discontinuing the medication.  - Onset: Severe symptoms from 09/2024 through 01/2025.  - Duration: Symptoms ceased within two days after discontinuing progestin.  - Character: Severe menopausal symptoms; anemia diagnosed in 10/2024.  - Alleviating/Aggravating Factors: Discontinuing progestin.  - Severity: Severe symptoms with only 9 days of relief; anemia returned to normal levels.    Increased Cholesterol Levels  She has noticed an increase in her cholesterol levels, which she attributes to menopause.  - Character: Increased cholesterol levels.  - Alleviating/Aggravating Factors: Attributes to menopause.    She takes sumatriptan and Excedrin Migraine as needed.    PAST SURGICAL HISTORY:  - Hysteroscopy (11/2024 or 12/2024)      Review of Systems   Constitutional:  Negative for chills and fever.   HENT:  Negative for sore throat and trouble swallowing.    Respiratory:  Negative for shortness of breath.    Cardiovascular:  Negative for chest pain, palpitations and leg swelling.   Gastrointestinal:  Negative for abdominal pain.   Skin:  Negative for rash.       Objective   BP (!) 148/92   Pulse 76   Resp 16   Ht 1.683 m (5' 6.25\")   Wt 67 kg (147 lb 9.6 oz)   SpO2 97%   BMI 23.64 kg/m²       Physical Exam  Constitutional:       Appearance: Normal appearance.   HENT:      Head: Normocephalic.   Eyes:      Conjunctiva/sclera: Conjunctivae normal.   Cardiovascular:      Rate and Rhythm: Normal rate and regular rhythm.      Heart sounds: " Normal heart sounds.   Pulmonary:      Effort: Pulmonary effort is normal.      Breath sounds: Normal breath sounds.   Abdominal:      General: Abdomen is flat.      Palpations: Abdomen is soft.   Musculoskeletal:      Cervical back: Neck supple.   Skin:     General: Skin is warm and dry.   Neurological:      Mental Status: She is alert.         Assessment & Plan  Well adult exam         Elevated blood pressure reading            Assessment & Plan  1. Elevated blood pressure: Acute.  - Blood pressure readings today are consistently high at 155/92, significantly increased from previous readings of 138/86 and 114/66 in October 2024.  - Advised to monitor blood pressure at home daily for the next week, ensuring relaxation during measurements.  - Target blood pressure is set at 130/80; readings to be communicated via [employer].  - If blood pressure remains in the mid to high 80s, consideration will be given to initiating treatment.    2. Hypercholesterolemia: Chronic.  - Cholesterol levels have increased compared to previous readings: in 2023, cholesterol was 143, HDL was 42, and LDL was 80.  - Change attributed to menopause and reduced physical activity due to a more sedentary job.  - Advised to incorporate regular exercise into routine, aiming for 20 to 30 minutes of physical activity daily.    3. Menopausal symptoms: Resolved.  - Experienced severe menopausal symptoms from September through January, including heavy bleeding and anemia. Treated with progestin, which was ineffective until discontinued; symptoms have since resolved, and anemia has corrected.  - Weight has fluctuated, possibly due to menopause and hormonal changes.    4. Health maintenance.  - GYN care is up to date; last hysteroscopy and biopsy were normal.  - Not due for a colonoscopy until 2027.  - Declined shingles and hepatitis B vaccines at this time.    Follow-up  - A follow-up visit is scheduled in 1 month.      Neri Chatman, DO   This  medical note was created with the assistance of artificial intelligence (AI) for documentation purposes. The content has been reviewed and confirmed by the healthcare provider for accuracy and completeness. Patient consented to the use of audio recording and use of AI during their visit.

## 2025-07-28 DIAGNOSIS — Z00.00 ENCOUNTER FOR GENERAL ADULT MEDICAL EXAMINATION WITHOUT ABNORMAL FINDINGS: ICD-10-CM

## 2025-07-28 RX ORDER — SUMATRIPTAN SUCCINATE 100 MG/1
100 TABLET ORAL AS NEEDED
Qty: 27 TABLET | Refills: 3 | Status: SHIPPED | OUTPATIENT
Start: 2025-07-28

## 2025-08-13 ENCOUNTER — APPOINTMENT (OUTPATIENT)
Dept: PRIMARY CARE | Facility: CLINIC | Age: 55
End: 2025-08-13
Payer: COMMERCIAL